# Patient Record
Sex: MALE | Race: WHITE | NOT HISPANIC OR LATINO | Employment: UNEMPLOYED | URBAN - METROPOLITAN AREA
[De-identification: names, ages, dates, MRNs, and addresses within clinical notes are randomized per-mention and may not be internally consistent; named-entity substitution may affect disease eponyms.]

---

## 2019-07-10 ENCOUNTER — OFFICE VISIT (OUTPATIENT)
Dept: UROLOGY | Facility: MEDICAL CENTER | Age: 54
End: 2019-07-10
Payer: COMMERCIAL

## 2019-07-10 VITALS
HEIGHT: 66 IN | HEART RATE: 78 BPM | BODY MASS INDEX: 20.89 KG/M2 | SYSTOLIC BLOOD PRESSURE: 126 MMHG | DIASTOLIC BLOOD PRESSURE: 82 MMHG | WEIGHT: 130 LBS

## 2019-07-10 DIAGNOSIS — N41.0 ACUTE PROSTATITIS: Primary | ICD-10-CM

## 2019-07-10 PROCEDURE — 99204 OFFICE O/P NEW MOD 45 MIN: CPT | Performed by: UROLOGY

## 2019-07-10 RX ORDER — SULFAMETHOXAZOLE AND TRIMETHOPRIM 800; 160 MG/1; MG/1
1 TABLET ORAL EVERY 12 HOURS SCHEDULED
Qty: 28 TABLET | Refills: 0 | Status: SHIPPED | OUTPATIENT
Start: 2019-07-10 | End: 2019-07-24

## 2019-07-10 NOTE — PROGRESS NOTES
Assessment/Plan:  Urinalysis is clear  Symptoms mildly suggestive of prostatitis, will treat with Bactrim twice per day for his 14 days  PSA in six weeks  No problem-specific Assessment & Plan notes found for this encounter  Diagnoses and all orders for this visit:    Acute prostatitis  -     sulfamethoxazole-trimethoprim (BACTRIM DS) 800-160 mg per tablet; Take 1 tablet by mouth every 12 (twelve) hours for 14 days  -     PSA Total, Diagnostic; Future          Subjective:      Patient ID: Wil Ortiz is a 48 y o  male  Patient seen many years ago for a vasectomy  Current issue is up to one year of the following symptoms:  Occasional pain at tip of penis before or at the end of urination  Bad odor to urine  Needles and pins sensation sometimes during ejaculation    Overall his urine has a good stream, not much frequency, occasional aching in the perineum  The following portions of the patient's history were reviewed and updated as appropriate: allergies, current medications, past family history, past medical history, past social history, past surgical history and problem list     Review of Systems   All other systems reviewed and are negative  Objective:      /82 (BP Location: Left arm, Patient Position: Sitting, Cuff Size: Adult)   Pulse 78   Ht 5' 6" (1 676 m)   Wt 59 kg (130 lb)   BMI 20 98 kg/m²          Physical Exam   Constitutional: He is oriented to person, place, and time  He appears well-developed and well-nourished  No distress  HENT:   Head: Normocephalic and atraumatic  Eyes: Conjunctivae are normal    Cardiovascular: Normal rate and regular rhythm  Pulmonary/Chest: Effort normal and breath sounds normal  No respiratory distress  He has no wheezes  Abdominal: Soft  Bowel sounds are normal  He exhibits no distension and no mass  There is no tenderness     Genitourinary:   Genitourinary Comments: Penis and testes normal    Prostate large nodules Neurological: He is alert and oriented to person, place, and time  Skin: Skin is warm and dry  He is not diaphoretic

## 2019-07-24 PROBLEM — IMO0001 ASYMMETRICAL LEFT SENSORINEURAL HEARING LOSS: Status: ACTIVE | Noted: 2019-07-24

## 2019-07-25 PROBLEM — H93.12 LEFT-SIDED TINNITUS: Status: ACTIVE | Noted: 2019-07-25

## 2020-12-11 ENCOUNTER — HOSPITAL ENCOUNTER (OUTPATIENT)
Dept: MRI IMAGING | Facility: HOSPITAL | Age: 55
Discharge: HOME | End: 2020-12-11
Attending: OTOLARYNGOLOGY
Payer: COMMERCIAL

## 2020-12-11 DIAGNOSIS — IMO0001 ASYMMETRICAL LEFT SENSORINEURAL HEARING LOSS: ICD-10-CM

## 2020-12-11 PROCEDURE — 70553 MRI BRAIN STEM W/O & W/DYE: CPT

## 2020-12-11 PROCEDURE — A9577 INJ MULTIHANCE: HCPCS | Performed by: OTOLARYNGOLOGY

## 2020-12-11 PROCEDURE — G1004 CDSM NDSC: HCPCS

## 2020-12-11 RX ADMIN — GADOBENATE DIMEGLUMINE 7 ML: 529 INJECTION, SOLUTION INTRAVENOUS at 11:43

## 2021-06-10 ENCOUNTER — OFFICE VISIT (OUTPATIENT)
Dept: URGENT CARE | Facility: CLINIC | Age: 56
End: 2021-06-10
Payer: COMMERCIAL

## 2021-06-10 VITALS
SYSTOLIC BLOOD PRESSURE: 121 MMHG | DIASTOLIC BLOOD PRESSURE: 73 MMHG | HEART RATE: 83 BPM | RESPIRATION RATE: 18 BRPM | HEIGHT: 65 IN | TEMPERATURE: 98.6 F | BODY MASS INDEX: 23.32 KG/M2 | WEIGHT: 140 LBS | OXYGEN SATURATION: 98 %

## 2021-06-10 DIAGNOSIS — R09.82 POST-NASAL DRIP: Primary | ICD-10-CM

## 2021-06-10 PROCEDURE — 99213 OFFICE O/P EST LOW 20 MIN: CPT | Performed by: NURSE PRACTITIONER

## 2021-06-10 NOTE — PROGRESS NOTES
[facility]        NAME: Martita Jones is a 54 y o  male  : 1965    MRN: [id number]  DATE: Li 10, 2021  TIME: 10:55 AM    Assessment and Plan   Post-nasal drip [R09 82]  1  Post-nasal drip           Patient Instructions     Patient Instructions   The this may be related to postnasal drip  Would recommend you start allergy medication  You can continue with Chloraseptic spray  Follow-up if you develop any worsening symptoms such as fever, cough, congestion  Go to the ER with chest pain or shortness of breath  Postnasal Drip   AMBULATORY CARE:   Postnasal drip  is a condition that causes a large amount of mucus to collect in your throat or nose  It may also be called upper airway cough syndrome because the mucus causes repeated coughing  You may have a sore throat, or throat tissues may swell  This may feel like a lump in your throat  You may also feel like you need to clear your throat often  Contact your healthcare provider if:   · You have trouble breathing because of the mucus  · You have new or worsening symptoms, even with treatment  · You have signs of an infection, such as yellow or green mucus, or a fever  · You have questions or concerns about your condition or care  Treatment  may include any of the following:  · Medicines  may be given to thin the mucus  You may need to swallow the medicine or use a device to flush your sinuses with liquid squirted into your nose  Nasal sprays may also be needed to keep the tissues in your nose moist  Medicines can also relieve congestion  Allergy medicine may help if your symptoms are caused by seasonal allergies, such as hay fever  You may need medicine to help control GERD  · Antibiotics  may be needed to treat a bacterial infection  Manage postnasal drip:   · Use a humidifier or vaporizer  Use a cool mist humidifier or a vaporizer to increase air moisture in your home  This may make it easier for you to breathe  · Drink more liquids as directed  Liquids help keep your air passages moist and help you cough up mucus  Ask how much liquid to drink each day and which liquids are best for you  · Avoid cold air and dry, heated air  Cold or dry air can trigger postnasal drip  Try to stay inside on cold days, or keep your mouth covered  Do not stay long in areas that have dry, heated air  · Do not smoke, and avoid secondhand smoke  Nicotine and other chemicals in cigarettes and cigars can irritate your throat and make coughing worse  Ask your healthcare provider for information if you currently smoke and need help to quit  E-cigarettes or smokeless tobacco still contain nicotine  Talk to your healthcare provider before you use these products  Follow up with your healthcare provider as directed:  Write down your questions so you remember to ask them during your visits  © Copyright [address] Information is for End User's use only and may not be sold, redistributed or otherwise used for commercial purposes  All illustrations and images included in CareNotes® are the copyrighted property of A D A M , Inc  or [address] [place] [address]  The above information is an  only  It is not intended as medical advice for individual conditions or treatments  Talk to your doctor, nurse or pharmacist before following any medical regimen to see if it is safe and effective for you  Chief Complaint     Chief Complaint   Patient presents with    Sore Throat     X 1 week only in the morning  History of Present Illness   Raffy Sierra presents to the clinic c/o    This is a 26-year-old male here today with complaints of sore throat  He states sore throat is only in the morning  He uses some spray and symptoms resolved  He denies any cough, congestion, ear pain, fever, body aches, chills  He does know he does have postnasal drip especially while at work but does not notice sore throat at work    He does note that he was out of work for a while and recently return to work then symptoms started  He does work in a cooler  He denies any history of allergies  No chest pain or shortness of breath  The      Review of Systems   Review of Systems   Constitutional: Negative for activity change, chills, fatigue and fever  HENT: Positive for postnasal drip and sore throat  Respiratory: Negative  Cardiovascular: Negative  Neurological: Negative  Psychiatric/Behavioral: Negative  Current Medications     No long-term medications on file  Current Allergies     Allergies as of 06/10/2021 - Reviewed 06/10/2021   Allergen Reaction Noted    Codeine  03/28/2016            The following portions of the patient's history were reviewed and updated as appropriate: allergies, current medications, past family history, past medical history, past social history, past surgical history and problem list     Objective   /73   Pulse 83   Temp 98 6 °F (37 °C) (Temporal)   Resp 18   Ht 5' 5" (1 651 m)   Wt 63 5 kg (140 lb)   SpO2 98%   BMI 23 30 kg/m²        Physical Exam     Physical Exam  Vitals signs and nursing note reviewed  Constitutional:       General: He is in acute distress  Appearance: He is well-developed  He is not ill-appearing or toxic-appearing  HENT:      Mouth/Throat:      Comments: Posterior pharynx mildly erythemic with some cobblestone appearance  Cardiovascular:      Rate and Rhythm: Normal rate and regular rhythm  Pulmonary:      Effort: Pulmonary effort is normal  No respiratory distress  Breath sounds: Normal breath sounds  No stridor  No wheezing, rhonchi or rales  Chest:      Chest wall: No tenderness  Neurological:      General: No focal deficit present  Mental Status: He is alert and oriented to person, place, and time     Psychiatric:         Mood and Affect: Mood normal          Behavior: Behavior normal

## 2021-06-10 NOTE — PATIENT INSTRUCTIONS
The this may be related to postnasal drip  Would recommend you start allergy medication  You can continue with Chloraseptic spray  Follow-up if you develop any worsening symptoms such as fever, cough, congestion  Go to the ER with chest pain or shortness of breath  Postnasal Drip   AMBULATORY CARE:   Postnasal drip  is a condition that causes a large amount of mucus to collect in your throat or nose  It may also be called upper airway cough syndrome because the mucus causes repeated coughing  You may have a sore throat, or throat tissues may swell  This may feel like a lump in your throat  You may also feel like you need to clear your throat often  Contact your healthcare provider if:   · You have trouble breathing because of the mucus  · You have new or worsening symptoms, even with treatment  · You have signs of an infection, such as yellow or green mucus, or a fever  · You have questions or concerns about your condition or care  Treatment  may include any of the following:  · Medicines  may be given to thin the mucus  You may need to swallow the medicine or use a device to flush your sinuses with liquid squirted into your nose  Nasal sprays may also be needed to keep the tissues in your nose moist  Medicines can also relieve congestion  Allergy medicine may help if your symptoms are caused by seasonal allergies, such as hay fever  You may need medicine to help control GERD  · Antibiotics  may be needed to treat a bacterial infection  Manage postnasal drip:   · Use a humidifier or vaporizer  Use a cool mist humidifier or a vaporizer to increase air moisture in your home  This may make it easier for you to breathe  · Drink more liquids as directed  Liquids help keep your air passages moist and help you cough up mucus  Ask how much liquid to drink each day and which liquids are best for you  · Avoid cold air and dry, heated air  Cold or dry air can trigger postnasal drip   Try to stay inside on cold days, or keep your mouth covered  Do not stay long in areas that have dry, heated air  · Do not smoke, and avoid secondhand smoke  Nicotine and other chemicals in cigarettes and cigars can irritate your throat and make coughing worse  Ask your healthcare provider for information if you currently smoke and need help to quit  E-cigarettes or smokeless tobacco still contain nicotine  Talk to your healthcare provider before you use these products  Follow up with your healthcare provider as directed:  Write down your questions so you remember to ask them during your visits  © Copyright [address] [place] [address] Drive Information is for End User's use only and may not be sold, redistributed or otherwise used for commercial purposes  All illustrations and images included in CareNotes® are the copyrighted property of [employer]  or [facility] Maykel Muhammad   The above information is an  only  It is not intended as medical advice for individual conditions or treatments  Talk to your doctor, nurse or pharmacist before following any medical regimen to see if it is safe and effective for you

## 2021-08-05 ENCOUNTER — NURSE TRIAGE (OUTPATIENT)
Dept: OTHER | Facility: OTHER | Age: 56
End: 2021-08-05

## 2021-08-05 DIAGNOSIS — Z11.59 SPECIAL SCREENING EXAMINATION FOR VIRAL DISEASE: Primary | ICD-10-CM

## 2021-08-05 PROCEDURE — U0003 INFECTIOUS AGENT DETECTION BY NUCLEIC ACID (DNA OR RNA); SEVERE ACUTE RESPIRATORY SYNDROME CORONAVIRUS 2 (SARS-COV-2) (CORONAVIRUS DISEASE [COVID-19]), AMPLIFIED PROBE TECHNIQUE, MAKING USE OF HIGH THROUGHPUT TECHNOLOGIES AS DESCRIBED BY CMS-2020-01-R: HCPCS | Performed by: FAMILY MEDICINE

## 2021-08-05 PROCEDURE — U0005 INFEC AGEN DETEC AMPLI PROBE: HCPCS | Performed by: FAMILY MEDICINE

## 2021-08-05 NOTE — TELEPHONE ENCOUNTER
Reason for Disposition   [1] COVID-19 diagnosed by positive lab test AND [2] NO symptoms (e g , cough, fever, others)    Additional Information   Negative: [1] COVID-19 infection suspected by caller or triager AND [2] mild symptoms (cough, fever, or others) AND [1] no complications or SOB    Protocols used: CORONAVIRUS (COVID-19) [facility]AGNOSED OR SUSPECTED[facility]

## 2021-08-05 NOTE — TELEPHONE ENCOUNTER
1  Were you within 6 feet or less, for up to 15 minutes or more with a person that has a confirmed COVID-19 test? His sister in law is positive  2  What was the date of your exposure? 7/31/2021  3  Are you experiencing any symptoms attributed to the virus?  (Assess for SOB, cough, fever, difficulty breathing) none today he was feeling under the weather yesterday  4   HIGH RISK: Do you have any history heart or lung conditions, weakened immune system, diabetes, Asthma, CHF, HIV, COPD, Chemo, renal failure, sickle cell, etc? none

## 2021-08-05 NOTE — TELEPHONE ENCOUNTER
Regarding: symptomatic-covid test-exposure  ----- Message from Adeline Ureña sent at 8/5/2021 10:51 AM EDT -----  "I have been feeling under the weather    I was told I was exposed to someone on Saturday, who tested positive for covid "

## 2023-12-06 ENCOUNTER — APPOINTMENT (OUTPATIENT)
Dept: RADIOLOGY | Facility: CLINIC | Age: 58
End: 2023-12-06
Payer: COMMERCIAL

## 2023-12-06 ENCOUNTER — OFFICE VISIT (OUTPATIENT)
Dept: FAMILY MEDICINE CLINIC | Facility: CLINIC | Age: 58
End: 2023-12-06
Payer: COMMERCIAL

## 2023-12-06 ENCOUNTER — HOSPITAL ENCOUNTER (OUTPATIENT)
Dept: ULTRASOUND IMAGING | Facility: HOSPITAL | Age: 58
Discharge: HOME | End: 2023-12-06
Attending: INTERNAL MEDICINE
Payer: COMMERCIAL

## 2023-12-06 VITALS
TEMPERATURE: 97.8 F | BODY MASS INDEX: 23.37 KG/M2 | OXYGEN SATURATION: 98 % | RESPIRATION RATE: 18 BRPM | SYSTOLIC BLOOD PRESSURE: 130 MMHG | DIASTOLIC BLOOD PRESSURE: 84 MMHG | HEART RATE: 68 BPM | HEIGHT: 66 IN | WEIGHT: 145.4 LBS

## 2023-12-06 DIAGNOSIS — N50.812 LEFT TESTICULAR PAIN: ICD-10-CM

## 2023-12-06 DIAGNOSIS — R10.32 LEFT GROIN PAIN: ICD-10-CM

## 2023-12-06 DIAGNOSIS — M17.12 PRIMARY OSTEOARTHRITIS OF LEFT KNEE: ICD-10-CM

## 2023-12-06 DIAGNOSIS — M25.552 LEFT HIP PAIN: ICD-10-CM

## 2023-12-06 DIAGNOSIS — Z00.00 ENCOUNTER FOR MEDICAL EXAMINATION TO ESTABLISH CARE: Primary | ICD-10-CM

## 2023-12-06 PROBLEM — IMO0001 ASYMMETRICAL LEFT SENSORINEURAL HEARING LOSS: Status: RESOLVED | Noted: 2019-07-24 | Resolved: 2023-12-06

## 2023-12-06 PROBLEM — N41.0 ACUTE PROSTATITIS: Status: RESOLVED | Noted: 2019-07-10 | Resolved: 2023-12-06

## 2023-12-06 PROBLEM — H93.12 LEFT-SIDED TINNITUS: Status: RESOLVED | Noted: 2019-07-25 | Resolved: 2023-12-06

## 2023-12-06 PROCEDURE — 76870 US EXAM SCROTUM: CPT

## 2023-12-06 PROCEDURE — 73562 X-RAY EXAM OF KNEE 3: CPT

## 2023-12-06 PROCEDURE — 99204 OFFICE O/P NEW MOD 45 MIN: CPT | Performed by: INTERNAL MEDICINE

## 2023-12-06 NOTE — PROGRESS NOTES
[address] [facility]        NAME: Alex Jarvis is a 62 y.o. male  : 1965    MRN: [id number]  DATE: 2023  TIME: 1:59 PM    Assessment and Plan   1. Encounter for medical examination to establish care    2. Left groin pain  -     US scrotum and groin area; Future; Expected date: 2023  -     CT lower extremity wo contrast left; Future; Expected date: 2023    3. Left testicular pain  -     US scrotum and groin area; Future; Expected date: 2023    4. Primary osteoarthritis of left knee  -     XR knee 3 vw left non injury; Future; Expected date: 2023    5. Left hip pain  -     CT lower extremity wo contrast left; Future; Expected date: 2023             Chief Complaint     Chief Complaint   Patient presents with   • Establish Care   • Annual Exam     refusing         History of Present Illness       61yo male without significant past medical history other than osteoarthritis of his hands here to establish care and discuss left groin pain. Started abruptly a few months ago. Denies injury or trauma. Did have a fall but this occurred after pain began. Saw Urology because pain radiates to left testicle. Also had xray of bilateral hips showing moderate OA. Pain worse with lifting his leg, getting out of his car, climbing stairs, etc. No dysuria, hematuria or penile discharge. Taking ibuprofen with some relief. Declines screening tests and preventive services at this time. Would rather focus on acute issue. Review of Systems   Review of Systems   Constitutional:  Negative for appetite change, chills, fatigue and fever. Respiratory:  Negative for chest tightness and shortness of breath. Genitourinary:  Positive for testicular pain. Negative for difficulty urinating, genital sores, hematuria, penile pain, scrotal swelling and urgency. Musculoskeletal:  Positive for arthralgias and gait problem.          Current Medications     No current outpatient medications on file. Current Allergies     Allergies as of 12/06/2023 - Reviewed 12/06/2023   Allergen Reaction Noted   • Codeine Other (See Comments) 03/28/2016            The following portions of the patient's history were reviewed and updated as appropriate: allergies, current medications, past family history, past medical history, past social history, past surgical history and problem list.     Past Medical History:   Diagnosis Date   • Arthritis    • Environmental allergies    • HL (hearing loss)        Past Surgical History:   Procedure Laterality Date   • HAND SURGERY     • VASECTOMY         Family History   Adopted: Yes   Problem Relation Age of Onset   • No Known Problems Mother    • No Known Problems Father          Medications have been verified. Objective   /84   Pulse 68   Temp 97.8 °F (36.6 °C)   Resp 18   Ht 5' 6" (1.676 m)   Wt 66 kg (145 lb 6.4 oz)   SpO2 98%   BMI 23.47 kg/m²        Physical Exam     Physical Exam  Vitals reviewed. Exam conducted with a chaperone present. Constitutional:       General: He is not in acute distress. Appearance: He is normal weight. Abdominal:      Hernia: There is no hernia in the left inguinal area. Genitourinary:     Penis: Normal and circumcised. Testes:         Left: Tenderness present. Mass or swelling not present. Epididymis:      Left: Tenderness present. Comments: Tender along left inguinal area  Musculoskeletal:      Right hip: Normal.      Left hip: Tenderness present. No deformity or bony tenderness. Decreased range of motion. Decreased strength. Left knee: Crepitus present. Neurological:      Mental Status: He is alert. Motor: Motor function is intact. Gait: Gait is intact. Psychiatric:         Mood and Affect: Mood and affect normal.         Speech: Speech normal.         Behavior: Behavior normal. Behavior is cooperative.              Results:  No results found for: "SODIUM", "K", "CL", "CO2", "BUN", "CREATININE", "GLUC", "CALCIUM"    No results found for: "HGBA1C"    No results found for: "WBC", "HGB", "HCT", "MCV", "PLT"

## 2023-12-06 NOTE — PATIENT INSTRUCTIONS
Please get repeat US groin and scrotum at this time.  If normal, will order CT left hip  Xray left knee ordered as well

## 2023-12-14 DIAGNOSIS — N43.40 SPERMATOCELE OF EPIDIDYMIS: Primary | ICD-10-CM

## 2023-12-15 ENCOUNTER — HOSPITAL ENCOUNTER (OUTPATIENT)
Dept: CT IMAGING | Facility: HOSPITAL | Age: 58
End: 2023-12-15
Attending: INTERNAL MEDICINE
Payer: COMMERCIAL

## 2023-12-15 DIAGNOSIS — M25.552 LEFT HIP PAIN: ICD-10-CM

## 2023-12-15 DIAGNOSIS — R10.32 LEFT GROIN PAIN: ICD-10-CM

## 2023-12-15 PROCEDURE — G1004 CDSM NDSC: HCPCS

## 2023-12-15 PROCEDURE — 73700 CT LOWER EXTREMITY W/O DYE: CPT

## 2023-12-19 DIAGNOSIS — M25.552 LEFT HIP PAIN: ICD-10-CM

## 2023-12-19 DIAGNOSIS — M87.00 AVN (AVASCULAR NECROSIS OF BONE) (HCC): Primary | ICD-10-CM

## 2023-12-21 ENCOUNTER — OFFICE VISIT (OUTPATIENT)
Dept: OBGYN CLINIC | Facility: CLINIC | Age: 58
End: 2023-12-21
Payer: COMMERCIAL

## 2023-12-21 VITALS
BODY MASS INDEX: 23.3 KG/M2 | SYSTOLIC BLOOD PRESSURE: 160 MMHG | DIASTOLIC BLOOD PRESSURE: 98 MMHG | HEART RATE: 73 BPM | HEIGHT: 66 IN | WEIGHT: 145 LBS

## 2023-12-21 DIAGNOSIS — M16.12 PRIMARY OSTEOARTHRITIS OF ONE HIP, LEFT: Primary | ICD-10-CM

## 2023-12-21 DIAGNOSIS — M25.552 LEFT HIP PAIN: ICD-10-CM

## 2023-12-21 DIAGNOSIS — M87.00 AVN (AVASCULAR NECROSIS OF BONE) (HCC): ICD-10-CM

## 2023-12-21 PROCEDURE — 99204 OFFICE O/P NEW MOD 45 MIN: CPT | Performed by: STUDENT IN AN ORGANIZED HEALTH CARE EDUCATION/TRAINING PROGRAM

## 2023-12-21 RX ORDER — IBUPROFEN 800 MG/1
TABLET ORAL
COMMUNITY

## 2023-12-21 NOTE — PROGRESS NOTES
Ortho Sports Medicine New Patient Hip Visit    Assesment:   58 y.o. male with left hip pain ongoing for approximately 1 month without any known injury.  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    Plan:  Reviewed the history, exam, and imaging with the patient in clinic today.  I discussed with the patient that he does have left hip OA as well as AVN of the femoral head without collapse on imaging.  On exam, patient consistently endorses pain in the left groin with provocative hip maneuvers.  I discussed potential treatment options including both conservative and surgical management.  Patient has tried oral pain medications without significant improvement.  I did discuss other conservative management options including physical therapy and injections.  Patient was interested in discussion of his surgical options.  Discussed with the patient that I would place a referral to one of our joint reconstruction colleagues for further discussion of surgical options.  Patient was amenable to this plan.  All his questions were answered.  He can follow-up on an as-needed basis.    Conservative Treatment:   Ice to hip region for 20 minutes at least 1-2 times daily.  Tylenol for pain.  Referral provided to Dr. Hannah to discuss surgical intervention    Imaging:  All imaging from prior to today was reviewed by myself and explained to the patient.     Injection:    No Injection planned at this time.    Surgery:  No surgery is recommended at this point, continue with conservative treatment plan as noted.    Follow up:  Return if symptoms worsen or fail to improve.        Chief Complaint   Patient presents with    Left Hip - Pain       History of Present Illness:  The patient is a 58 y.o. male seen in clinic for left hip pain. The pain started 1 month ago. There was no mechanism of injury. The pain is now located in the left groin area. Symptoms are aggravated by standing after  prolonged sitting.  After ambulating for a short period of time, the pain will then dissipated. The patient denies any low back pain, radiating pain down the lower extremity, numbness or tingling. The patient has tried Ibuprofen, ice and heat, all of which do help his pain. Symptoms have not improved since onset. Patient has a history of prior prednisone use 6 months to a year ago.  He drinks alcohol occasionally on weekends.  Denies tobacco/HIV.    The patient has the following co-morbidities: n/a      Hip Surgical History:  None    Past Medical, Social and Family History:  Past Medical History:   Diagnosis Date    Arthritis     Environmental allergies     HL (hearing loss)      Past Surgical History:   Procedure Laterality Date    HAND SURGERY      VASECTOMY       Allergies   Allergen Reactions    Codeine Other (See Comments)     Unknown, reaction in childhood     Current Outpatient Medications on File Prior to Visit   Medication Sig Dispense Refill    ibuprofen (MOTRIN) 800 mg tablet take 1 tablet by mouth every 8 hours if needed for mild pain       No current facility-administered medications on file prior to visit.     Social History     Socioeconomic History    Marital status:      Spouse name: Not on file    Number of children: 1    Years of education: Not on file    Highest education level: Not on file   Occupational History    Not on file   Tobacco Use    Smoking status: Never    Smokeless tobacco: Never   Vaping Use    Vaping status: Never Used   Substance and Sexual Activity    Alcohol use: Yes     Alcohol/week: 2.0 standard drinks of alcohol     Types: 2 Standard drinks or equivalent per week     Comment: drinks on weekends    Drug use: Never    Sexual activity: Not on file   Other Topics Concern    Not on file   Social History Narrative    Daily caffeine use- 1 cup of coffee     Social Determinants of Health     Financial Resource Strain: Not on file   Food Insecurity: Not on file  "  Transportation Needs: Not on file   Physical Activity: Not on file   Stress: Not on file   Social Connections: Not on file   Intimate Partner Violence: Not on file   Housing Stability: Not on file         I have reviewed the past medical, surgical, social and family history, medications and allergies as documented in the EMR.    Review of systems: ROS is negative other than that noted in the HPI.  Psychiatric/Behavioral: Negative for agitation.      Physical Exam:    Blood pressure 160/98, pulse 73, height 5' 6\" (1.676 m), weight 65.8 kg (145 lb).    General/Constitutional: NAD, well developed, well nourished  HENT: Normocephalic, atraumatic  CV: Intact distal pulses, regular rate  Resp: No respiratory distress or labored breathing  Abd: No abdominal distention  Lymphatic: No lymphadenopathy palpated  Neuro: Alert and Oriented x 3, no focal deficits noted  Psych: Normal mood, normal affect, normal judgement, normal behavior  Skin: Warm, dry, no rashes, no erythema      Focused left Hip Exam:  No dislocation/deformity  ROM: forward flexion of 100 degrees, 20 degrees and 40 ER  Greater troch:non-tender   SI joint: non-tender  Jenelle test: negative  Darlene's test:  not tested  Abduction: 5/5  AT/GS intact  Positive log roll  Pain with resisted hip flexion    Back:    No TTP over lumbar spinous processes, paraspinal musculature  Negative SLR     No calf tenderness to palpation bilaterally    LE NV Exam: +2 DP/PT pulses bilaterally  Sensation intact to light touch L2-S1 bilaterally, SPN/DPN/TA motor intact    Hip Imaging:    X-rays of the left hip with pelvis were obtained on 9/19/23 and reviewed with the patient. Based on my independent evaluation, the imaging shows moderate osteoarthritis of the bilateral hips.    CT of the left lower extremity was obtained on 12/15/23 and reviewed with the patient. Based on my independent evaluation, the imaging shows avascular necrosis of the left femoral head without subchondral " cortical collapse as well as moderate left hip osteoarthritis..      Scribe Attestation      I,:  Lyubov Richmond PA-C am acting as a scribe while in the presence of the attending physician.:       I,:  Ubaldo Gracia MD personally performed the services described in this documentation    as scribed in my presence.:

## 2024-01-05 ENCOUNTER — OFFICE VISIT (OUTPATIENT)
Dept: OBGYN CLINIC | Facility: CLINIC | Age: 59
End: 2024-01-05
Payer: COMMERCIAL

## 2024-01-05 ENCOUNTER — APPOINTMENT (OUTPATIENT)
Dept: RADIOLOGY | Age: 59
End: 2024-01-05
Payer: COMMERCIAL

## 2024-01-05 VITALS
WEIGHT: 145 LBS | DIASTOLIC BLOOD PRESSURE: 90 MMHG | HEIGHT: 66 IN | BODY MASS INDEX: 23.3 KG/M2 | HEART RATE: 85 BPM | SYSTOLIC BLOOD PRESSURE: 150 MMHG

## 2024-01-05 DIAGNOSIS — M87.00 AVN (AVASCULAR NECROSIS OF BONE) (HCC): ICD-10-CM

## 2024-01-05 DIAGNOSIS — M16.12 PRIMARY OSTEOARTHRITIS OF ONE HIP, LEFT: ICD-10-CM

## 2024-01-05 DIAGNOSIS — M87.00 AVN (AVASCULAR NECROSIS OF BONE) (HCC): Primary | ICD-10-CM

## 2024-01-05 PROCEDURE — 99215 OFFICE O/P EST HI 40 MIN: CPT | Performed by: STUDENT IN AN ORGANIZED HEALTH CARE EDUCATION/TRAINING PROGRAM

## 2024-01-05 PROCEDURE — 73502 X-RAY EXAM HIP UNI 2-3 VIEWS: CPT

## 2024-01-05 RX ORDER — ASCORBIC ACID 500 MG
500 TABLET ORAL 2 TIMES DAILY
Qty: 60 TABLET | Refills: 0 | Status: SHIPPED | OUTPATIENT
Start: 2024-01-05 | End: 2024-02-04

## 2024-01-05 RX ORDER — CHLORHEXIDINE GLUCONATE ORAL RINSE 1.2 MG/ML
15 SOLUTION DENTAL ONCE
OUTPATIENT
Start: 2024-01-05 | End: 2024-01-05

## 2024-01-05 RX ORDER — CHLORHEXIDINE GLUCONATE 4 G/100ML
SOLUTION TOPICAL DAILY PRN
OUTPATIENT
Start: 2024-01-05

## 2024-01-05 RX ORDER — ACETAMINOPHEN 325 MG/1
975 TABLET ORAL ONCE
OUTPATIENT
Start: 2024-01-05 | End: 2024-01-05

## 2024-01-05 RX ORDER — FOLIC ACID 1 MG/1
1 TABLET ORAL DAILY
Qty: 30 TABLET | Refills: 0 | Status: SHIPPED | OUTPATIENT
Start: 2024-01-05 | End: 2024-02-04

## 2024-01-05 RX ORDER — FERROUS SULFATE 324(65)MG
324 TABLET, DELAYED RELEASE (ENTERIC COATED) ORAL EVERY OTHER DAY
Qty: 15 TABLET | Refills: 0 | Status: SHIPPED | OUTPATIENT
Start: 2024-01-05 | End: 2024-02-04

## 2024-01-05 RX ORDER — MULTIVIT-MIN/IRON FUM/FOLIC AC 7.5 MG-4
1 TABLET ORAL DAILY
Qty: 30 TABLET | Refills: 0 | Status: SHIPPED | OUTPATIENT
Start: 2024-01-05 | End: 2024-02-04

## 2024-01-05 NOTE — PROGRESS NOTES
Date: 24  Andrei Morales   MRN# [id number]  : 1965      Chief Complaint: Left Hip Pain    Assessment and Plan:    Left Hip Avascular Necrosis   Left Hip Osteoarthritis     Patient is a Middle-Aged (Approx 40-64yo) male with functionally limiting hip pain with short distances and no modifiable risk factors.  Furthermore, they have radiographs which demonstrate moderate OA with AVN of the femoral head with significant cystic degeneration and slight subchondral collapse and a physical exam revealing moderate range of motion limitation. Given this, I have recommended surgical treatment in the for of total hip arthroplasty.    TOTAL HIP REPLACEMENT INDICATIONS AND RISKS  We had a lengthy discussion with the patient regarding the potential options for treatment.  Based on current presentation, radiographic exam, and lack of sufficient response to nonsurgical management including activity modification, NSAIDs and therapeutic exercise, I would offer treatment in the form of total hip arthroplasty at this time.      The potential risks and benefits were discussed in detail.    Patient current BMI is Body mass index is 23.4 kg/m²., I have recommended nutritionist and wt loss regimen if BMI over 35.    While no guarantees can be made, total hip replacement has a very high success rate in terms of relieving a patient's hip pain and returning them to a more active, independent lifestyle for 10-15 years or more. All surgery carries some risk; for hip replacement, the complication rate is low but may include: death (very rare), infection, bleeding requiring transfusion, blood clots in legs traveling to lungs, nerve and/or blood vessel damage, bone fracture, leg length difference, prosthetic hip dislocation, persistent hip pain and/or stiffness, and repeat surgery(ies). The risk of a major complication is generally 1-2 per 1000 cases. Total hip replacement should only be done if conservative treatment has  failed. The predicted revision rate is approximately 1% per year; in other words, 90% of hip replacements last 10 years or more, 80% last 20 years or more, and so on, assuming no injury. Additionally, we discussed anesthesia related complications which will be discussed in greater detail with the anesthesia team before surgery. The patient voiced their understanding of the surgical plan and potential complications and wishes to proceed with surgery.           Subjective:     Hip Pain  Patient complains of left hip pain. He denies any acute injury or trauma. The pain began 1 month ago. The pain is located groin and lateral and is made worse with walking and standing.  He describes the symptoms as sharp and stabbing. Symptoms improve with rest, ice, avoiding painful activities. The symptoms are worse with activity, stair climbing, getting up from a chair, weight bearing. The hip has not given out or felt unstable.  The patient is active in none. Treatment to date has been ice, heat, NSAID's, without significant relief.    External Records Reviewed: historical medical records, office notes, radiology reports, and x-ray reports    Allergy:  Allergies   Allergen Reactions    Codeine Other (See Comments)     Unknown, reaction in childhood     Medications:  all current active meds have been reviewed  Past Medical History:  Past Medical History:   Diagnosis Date    Arthritis     Environmental allergies     HL (hearing loss)      Past Surgical History:  Past Surgical History:   Procedure Laterality Date    HAND SURGERY      VASECTOMY       Family History:  Family History   Adopted: Yes   Problem Relation Age of Onset    No Known Problems Mother     No Known Problems Father      Social History:  Social History     Substance and Sexual Activity   Alcohol Use Yes    Alcohol/week: 2.0 standard drinks of alcohol    Types: 2 Standard drinks or equivalent per week    Comment: drinks on weekends     Social History     Substance and  "Sexual Activity   Drug Use Never     Social History     Tobacco Use   Smoking Status Never   Smokeless Tobacco Never           ROS:   Review of Systems   Constitutional:  Negative for chills, fatigue, fever and unexpected weight change.   HENT:  Negative for hearing loss, nosebleeds and sore throat.    Eyes:  Negative for pain, redness and visual disturbance.   Respiratory:  Negative for cough, shortness of breath and wheezing.    Cardiovascular:  Negative for chest pain, palpitations and leg swelling.   Gastrointestinal:  Negative for abdominal pain, nausea and vomiting.   Endocrine: Negative for polydipsia and polyuria.   Genitourinary:  Negative for frequency and urgency.   Skin:  Negative for color change, rash and wound.   Neurological:  Negative for dizziness, weakness, numbness and headaches.   Psychiatric/Behavioral:  Negative for behavioral problems, self-injury and suicidal ideas.        Objective:   BP Readings from Last 1 Encounters:   01/05/24 150/90      Wt Readings from Last 1 Encounters:   01/05/24 65.8 kg (145 lb)      Pulse Readings from Last 1 Encounters:   01/05/24 85      BMI: Estimated body mass index is 23.4 kg/m² as calculated from the following:    Height as of this encounter: 5' 6\" (1.676 m).    Weight as of this encounter: 65.8 kg (145 lb).      Physical Exam  Constitutional:       General: He is not in acute distress.     Appearance: He is well-developed.   Eyes:      Conjunctiva/sclera: Conjunctivae normal.      Pupils: Pupils are equal, round, and reactive to light.   Cardiovascular:      Rate and Rhythm: Normal rate and regular rhythm.   Pulmonary:      Effort: Pulmonary effort is normal.      Breath sounds: Normal breath sounds.   Abdominal:      General: Bowel sounds are normal.      Palpations: Abdomen is soft.   Musculoskeletal:      Cervical back: Normal range of motion and neck supple.   Skin:     General: Skin is warm and dry.      Findings: No erythema or rash.   Neurological: "      Mental Status: He is alert and oriented to person, place, and time.      Deep Tendon Reflexes: Reflexes are normal and symmetric.   Psychiatric:         Behavior: Behavior normal.         Gait and Station:   antalgic    Left Hip     Inspection: normal color, temperature, turgor and moisture    Range of Motion: FF: 100, ER: 40, IR: 20 with pain    positive log roll   negative Trendelenburg sign  negative  Stinchfield  negative  ALINA  negative  FADDIR    Motor: 5/5 Q/HS/TA/GS/EHL/FHL    Vascular: Toes WWP with BCR    SILT DP/SP/Robert/Saph/Tib    Right Hip     Inspection: normal color, temperature, turgor and moisture    Range of Motion: normal without pain    negative  log roll  negative Trendelenburg sign  negative  Stinchfield  negative  ALINA  negative  FADDIR    Motor: 5/5 Q/HS/TA/GS/EHL/FHL    Vascular: Toes WWP with BCR    SILT DP/SP/Robert/Saph/Tib    Images:    I personally reviewed relevant images in the PACS system and my interpretation is as follows:  X-rays of the left Hip reveal moderate degenerative changes with subchondral cysts and joint space narrowing    CT scan of the left hip reveals avascular necrosis of the femoral head with likely early subchondral collapse and cystic changes about the femoral head. Moderate hip osteoarthritis.         Justin Hannah MD  Adult Reconstruction Specialist   [facility]    Scribe Attestation      I,:  Stephane Rucker am acting as a scribe while in the presence of the attending physician.:       I,:  Justin Hannah MD personally performed the services described in this documentation    as scribed in my presence.:

## 2024-01-05 NOTE — PATIENT INSTRUCTIONS
Justin Hannah MD  Adult Reconstruction Specialist  Department of Orthopedic Surgery  [facility] [address]        Office Locations:   [facility]  [address] [place] [address]  Phone [phone]    [facility]  [address] [place] [address]  Phone: [phone]        TOTAL HIP REPLACEMENT            PREPARING FOR SURGERY    Pre-Operative Medical Clearance:  Typically done 2-3 weeks before surgery by the [facility] ([facility]). The clinic will call you to schedule this appointment.   This visit includes general check up with Bloodwork, EKG, possible COVID test and any other test deemed necessary.  If you see any specialist for other health conditions (cardiologist, pulmonologist, oncologist, hematologist, etc.), you might be required to obtain clearance before scheduling surgery.  One week prior to surgery, you need to stop   all anti-inflammatories (Ibuprofen, Aleve, Celebrex, Meloxicam)   All blood thinners (Coumadin, Warfarin, Plavix, Xarelto, etc.). Contact your cardiologist to advise on need for bridging medication during that period.    Pre-Operative Authorization:  Our office will contact your insurance carrier at the time of scheduling to obtain pre-authorization of your surgery. If authorization is not approved 1-2 days before surgery, you will be contacted to reschedule your procedure for a later date.   You may also contact your insurance carrier prior to surgery to get an estimated out-of-pocket expense of your scheduled procedure.     Surgery Time and Follow-up  Our office will contact you 2-3 days before your surgery date to inform you about the exact arrival time.  Our office will schedule the first post op appointment as well. This will be 10-14 days after surgery.  Your second follow up is around 6 weeks after surgery, and Xray will be taken during that visit.    Pain Medications and Pain  Management  If you have been taking opioids/narcotics from a pain management or another doctor, you will need to obtain adequate pain pills from that doctor. Please make those arrangements before surgery to have what you need for recovery.  We will make every effort to have your prescription sent to the pharmacy 2-3 days prior to surgery or on the same day of surgery. Please verify the correct pharmacy location with office staff as this cannot be changed once narcotics are sent.    Assistance  Majority of patients will go home same day of surgery. Make arrangements for a family member or friend to drive you home.  Most patients will only need someone around to assist them for the first few days after surgery.  In certain situations, you may stay overnight in the hospital and be discharged home the next day with home health services if needed.  If you live more than 2 hours away from the hospital, you will stay overnight in the hospital and be discharged home the next day.  It may take you 3-4 weeks before felling comfortable driving.    Dental Work  Complete any dental work prior to scheduling surgery.  This includes cleaning, fillings, extractions or treatment of any dental infection.  Failure to take care of oral health prior to your surgery could delay your surgery as a dental infection could put you at risk of surgical infection.    Work / FMLA forms  Please contact our office to complete any required form.  Allow 5-7 days for completion.          INSTRUCTIONS FOR THE DAY BEFORE AND MORNING OF SURGERY      Day Before Surgery:  Unless you have history allergy to acetaminophen (Tylenol) or history of liver disease, please take 2 Tylenol 500mg tablets every 6-8 hours for a total of 3 times only. Do not exceed 3000mg in a 24-hour period.  Wash with Hibiclens (Chlorhexidine soap) provided by Anesthesia Clinic in the evening.  DO NOT EAT OR DRINK ANYTHING AFTER MIDNIGHT THE NIGHT BEFORE SURGERY, REGARDLESS OF YOUR  SURGERY TIME, unless specifically instructed so by anesthesia.  Try to change bed sheets the night before surgery.  Get a good night's sleep !!!    Morning of Surgery:  DO NOT EAT OR DRINK ANYTHING UNLESS INSTRUCTED BY [facility] CLINIC.  Wash again with Hibiclens.  Arrive at the hospital at your given time.  Go the registration desk for check-in. Have your photo ID and insurance card.  The hospital will arrange for all Durable Medical Equipment (Walker, Toilet Seat, etc.)

## 2024-01-12 ENCOUNTER — TELEPHONE (OUTPATIENT)
Dept: OBGYN CLINIC | Facility: CLINIC | Age: 59
End: 2024-01-12

## 2024-01-12 NOTE — TELEPHONE ENCOUNTER
Called and left message for patient regarding moving up his surgery from 3/27/24 to 2/28/24. Stated that we have an opening on 2/28 if he'd like to take it.   Provided my direct ph [phone] for call back.

## 2024-01-15 NOTE — TELEPHONE ENCOUNTER
Patient called back and agreed to moving his surgery from 3/27/24 to 2/28/24 at . All related appointments updated.

## 2024-02-01 ENCOUNTER — TELEPHONE (OUTPATIENT)
Age: 59
End: 2024-02-01

## 2024-02-01 NOTE — TELEPHONE ENCOUNTER
Called and spoke with patient. Explained that patient should start taking all 4 pre-op vitamins 30 days prior to surgery. Patient understood this, but was just reading the label on the folic acid and wanted clarification.   All questions answered.

## 2024-02-01 NOTE — TELEPHONE ENCOUNTER
Caller: Patient    Doctor: [place]    Reason for call: Patient is asking if he should take the folic acid before or after the surgery.    Call back#: [phone]    ARTHROPLASTY HIP TOTAL ANTERIOR, LEFT, SAMEDAY DISCHARGE (Left: Hip)

## 2024-02-02 ENCOUNTER — TELEPHONE (OUTPATIENT)
Dept: OBGYN CLINIC | Facility: HOSPITAL | Age: 59
End: 2024-02-02

## 2024-02-02 PROBLEM — M19.049 OSTEOARTHRITIS OF METACARPOPHALANGEAL (MCP) JOINT: Status: ACTIVE | Noted: 2020-01-30

## 2024-02-02 NOTE — PROGRESS NOTES
Internal Medicine Pre-Operative Evaluation:     Reason for Visit: Pre-operative Evaluation for Risk Stratification and Optimization    Patient ID: Andrei Morales is a 58 y.o. male.     Surgery: Arthroplasty of left Hip  Referring Provider: Dr. Hannah      Recommendations to Proceed withSurgery    Patient is considered to be Low risk for Medium risk procedure.     After evaluation and discussion with patient with emphasis that all surgery has some degree of inherent risk it is determined this procedure is of acceptable risk  medically.    Patient may proceed with planned procedure      Assessment    Pre-operative Medical Evaluation for planned surgery  Recommendations as listed in PLAN section below  Contact surgical nurse  navigator with any questions regarding preoperative plan or schedule.      Problem List Items Addressed This Visit          Musculoskeletal and Integument    AVN (avascular necrosis of bone) (HCC)     Left hip  Failed outpatient conservative measures  Electing for arthroplasty            Other    Prediabetes     Hgb A1c 6.2  Recommend following DM diet  Monitor FBS            Other Visit Diagnoses       Preoperative clearance    -  Primary    Primary osteoarthritis of one hip, left                     Plan:     1. Further preoperative workup as follows:   - none no further testing required may proceed with surgery    2. Medication Management/Recommendations:   - hold aspirin 7 days prior to surgery  - avoid use of NSAID such as motrin, advil, aleve for 7 days prior to surgery  - hold all OTC herbal or nutritional supplements 7 days before surgery    3. Patient requires further consultation with:   No Consults Required    4. Discharge Planning / Barriers to Discharge  none identified - patients has post discharge therapy plan in place, transportation arranged for discharge day, adequate family support at home to assist with discharge to home.        Subjective:           History of  "Present Illness:     Andrei Morales is a 58 y.o. male who presents to the office today for a preoperative consultation at the request of surgeon. The patient understands this is an elective procedure and not emergent. They are electing to undergo planned procedure with an understanding that all surgery has inherent risk. They have worked with their surgeon and failed conservative treatment measures. Today they present for preoperative risk assessment and recommendations for optimization in preparation for surgery.    Pt seen in surgical optimization center for upcoming proposed surgery. They have failed previous conservative measures and have elected surgical intervention.     Pt meets presurgical lab and BMI optimization goals.    Upon interview questioning patient is able to perform greater than 4 METs workload in daily life without any reported cardio-pulmonary symptoms.          ROS: No TIA's or unusual headaches, no dysphagia.  No prolonged cough. No dyspnea or chest pain on exertion.  No abdominal pain, change in bowel habits, black or bloody stools.  No urinary tract or BPH symptoms.  Positive reported pain in arthritic joint. Positive difficulty with gait. No skin rashes or issues.      Objective:    /86   Pulse 86   Ht 5' 6\" (1.676 m)   Wt 66.7 kg (147 lb)   SpO2 97%   BMI 23.73 kg/m²       General Appearance: no distress, conversive  HEENT: PERRLA, conjuctiva normal; oropharynx clear; mucous membranes moist;   Neck:  Supple, no lymphadenopathy or thyromegaly  Lungs: breath sounds normal, normal respiratory effort, no retractions, expiratory effort normal  CV: normal heart sounds S1/S2, PMI normal   ABD: soft non tender, no masses , no hepatic or splenomegaly  EXT: DP pulses intact, no lymphadenopathy, no edema  Skin: normal turgor, normal texture, no rash  Psych: affect normal, mood normal  Neuro: AAOx3        The following portions of the patient's history were reviewed and updated as " "appropriate: allergies, current medications, past family history, past medical history, past social history, past surgical history and problem list.     Past History:       Past Medical History:   Diagnosis Date    Adopted person     Arthritis     Environmental allergies     \"outside allergies sometimes\"    Exercise involving walking     occas    HL (hearing loss)     Left hip pain     and leg    Tinnitus     Use of cane as ambulatory aid     sometimes    Wears glasses     \"magnifiers to read\"    Past Surgical History:   Procedure Laterality Date    HAND SURGERY      VASECTOMY            Social History     Tobacco Use    Smoking status: Never    Smokeless tobacco: Never   Vaping Use    Vaping status: Never Used   Substance Use Topics    Alcohol use: Yes     Alcohol/week: 2.0 standard drinks of alcohol     Types: 2 Standard drinks or equivalent per week     Comment: drinks on weekends    Drug use: Never     Family History   Adopted: Yes   Problem Relation Age of Onset    No Known Problems Mother     No Known Problems Father           Allergies:     Allergies   Allergen Reactions    Codeine Other (See Comments)     Unknown, reaction in childhood--\"cough syrup with codeine\"        Current Medications:     Current Outpatient Medications   Medication Instructions    acetaminophen (TYLENOL) 500 mg, Oral, Every 6 hours PRN    ascorbic acid (VITAMIN C) 500 mg, Oral, 2 times daily    ferrous sulfate 324 mg, Oral, Every other day    folic acid (FOLVITE) 1 mg, Oral, Daily    ibuprofen (MOTRIN) 800 mg tablet take 1 tablet by mouth every 8 hours if needed for mild pain    Multiple Vitamins-Minerals (multivitamin with minerals) tablet 1 tablet, Oral, Daily           PRE-OP WORKSHEET DATA    Assessment of Pre-Operative Risks     MLJ Quality Hard Stops:  BMI (<40) : Estimated body mass index is 23.73 kg/m² as calculated from the following:    Height as of this encounter: 5' 6\" (1.676 m).    Weight as of this encounter: 66.7 kg (147 " lb).    Hgb ( >11):   Lab Results   Component Value Date    HGB 14.9 02/08/2024     HbA1c (<7.5) :   Lab Results   Component Value Date    HGBA1C 6.2 (H) 02/08/2024     GFR (>60) (Less then 45 = Nephrology consult):  Estimated Creatinine Clearance: 82.6 mL/min (by C-G formula based on SCr of 0.88 mg/dL).      Active Decompensated Chronic Conditions which would delay surgery  No acutely decompensated medical issues such as recent CVA, MI, new onset arrhythmia, severe aortic stenosis, CHF, uncontrolled COPD       Exercise Capacity: (if less the 4 mets consider functional assessment via cardiac stress testing or consultation)    Able to walk 2 blocks without symptoms?: Yes  Able to walk 1 flights without symptoms?: Yes    Assessment of intra and post operative respiratory, hemodynamic and thrombotic risks     Prior Anesthesia Reactions: No     Personal history of venous thromboembolic disease? No    History of steroid use > 5 mg for >2 weeks within last year? No      The patient's risk factors for cardiac complications include :  none    Andrei Morales has an IN HOSPITAL cardiac risk of RCI RISK CLASS I (0 risk factors, risk of major cardiac compl. appr. 0.5%) based on RCRI calculator    Cardiac Risk Estimation: per the Revised Cardiac Risk Index (Circ. 100:1043, 1999),        Pre-Op Data Reviewed:       Laboratory Results: I have personally reviewed the pertinent laboratory results/reports     EKG:I have personally reviewed pertinent reports.  . I personally reviewed and interpreted available tracings in the electronic medical record    Normal sinus rhythm  Normal ECG  No previous ECGs available  Confirmed by Andrei Green ([id number]) on 2/8/2024     OLD RECORDS: reviewed old records in the chart review section if EHR on day of visit.    Previous cardiopulmonary studies within the past year:  Echocardiogram: no  Cardiac Catheterization: no  Stress Test: no      Time of visit including pre-visit chart review, visit and  post-visit coordination of plan and care , review of pre-surgical lab work, preparation and time spent documenting note in electronic medical record, time spent face-to-face in physical examination answering patient questions by care team 45 minutes             [facility]

## 2024-02-02 NOTE — H&P (VIEW-ONLY)
Internal Medicine Pre-Operative Evaluation:     Reason for Visit: Pre-operative Evaluation for Risk Stratification and Optimization    Patient ID: Andrei Morales is a 58 y.o. male.     Surgery: Arthroplasty of left Hip  Referring Provider: Dr. Hannah      Recommendations to Proceed withSurgery    Patient is considered to be Low risk for Medium risk procedure.     After evaluation and discussion with patient with emphasis that all surgery has some degree of inherent risk it is determined this procedure is of acceptable risk  medically.    Patient may proceed with planned procedure      Assessment    Pre-operative Medical Evaluation for planned surgery  Recommendations as listed in PLAN section below  Contact surgical nurse  navigator with any questions regarding preoperative plan or schedule.      Problem List Items Addressed This Visit          Musculoskeletal and Integument    AVN (avascular necrosis of bone) (HCC)     Left hip  Failed outpatient conservative measures  Electing for arthroplasty            Other    Prediabetes     Hgb A1c 6.2  Recommend following DM diet  Monitor FBS            Other Visit Diagnoses       Preoperative clearance    -  Primary    Primary osteoarthritis of one hip, left                     Plan:     1. Further preoperative workup as follows:   - none no further testing required may proceed with surgery    2. Medication Management/Recommendations:   - hold aspirin 7 days prior to surgery  - avoid use of NSAID such as motrin, advil, aleve for 7 days prior to surgery  - hold all OTC herbal or nutritional supplements 7 days before surgery    3. Patient requires further consultation with:   No Consults Required    4. Discharge Planning / Barriers to Discharge  none identified - patients has post discharge therapy plan in place, transportation arranged for discharge day, adequate family support at home to assist with discharge to home.        Subjective:           History of  "Present Illness:     Andrei Morales is a 58 y.o. male who presents to the office today for a preoperative consultation at the request of surgeon. The patient understands this is an elective procedure and not emergent. They are electing to undergo planned procedure with an understanding that all surgery has inherent risk. They have worked with their surgeon and failed conservative treatment measures. Today they present for preoperative risk assessment and recommendations for optimization in preparation for surgery.    Pt seen in surgical optimization center for upcoming proposed surgery. They have failed previous conservative measures and have elected surgical intervention.     Pt meets presurgical lab and BMI optimization goals.    Upon interview questioning patient is able to perform greater than 4 METs workload in daily life without any reported cardio-pulmonary symptoms.          ROS: No TIA's or unusual headaches, no dysphagia.  No prolonged cough. No dyspnea or chest pain on exertion.  No abdominal pain, change in bowel habits, black or bloody stools.  No urinary tract or BPH symptoms.  Positive reported pain in arthritic joint. Positive difficulty with gait. No skin rashes or issues.      Objective:    /86   Pulse 86   Ht 5' 6\" (1.676 m)   Wt 66.7 kg (147 lb)   SpO2 97%   BMI 23.73 kg/m²       General Appearance: no distress, conversive  HEENT: PERRLA, conjuctiva normal; oropharynx clear; mucous membranes moist;   Neck:  Supple, no lymphadenopathy or thyromegaly  Lungs: breath sounds normal, normal respiratory effort, no retractions, expiratory effort normal  CV: normal heart sounds S1/S2, PMI normal   ABD: soft non tender, no masses , no hepatic or splenomegaly  EXT: DP pulses intact, no lymphadenopathy, no edema  Skin: normal turgor, normal texture, no rash  Psych: affect normal, mood normal  Neuro: AAOx3        The following portions of the patient's history were reviewed and updated as " "appropriate: allergies, current medications, past family history, past medical history, past social history, past surgical history and problem list.     Past History:       Past Medical History:   Diagnosis Date    Adopted person     Arthritis     Environmental allergies     \"outside allergies sometimes\"    Exercise involving walking     occas    HL (hearing loss)     Left hip pain     and leg    Tinnitus     Use of cane as ambulatory aid     sometimes    Wears glasses     \"magnifiers to read\"    Past Surgical History:   Procedure Laterality Date    HAND SURGERY      VASECTOMY            Social History     Tobacco Use    Smoking status: Never    Smokeless tobacco: Never   Vaping Use    Vaping status: Never Used   Substance Use Topics    Alcohol use: Yes     Alcohol/week: 2.0 standard drinks of alcohol     Types: 2 Standard drinks or equivalent per week     Comment: drinks on weekends    Drug use: Never     Family History   Adopted: Yes   Problem Relation Age of Onset    No Known Problems Mother     No Known Problems Father           Allergies:     Allergies   Allergen Reactions    Codeine Other (See Comments)     Unknown, reaction in childhood--\"cough syrup with codeine\"        Current Medications:     Current Outpatient Medications   Medication Instructions    acetaminophen (TYLENOL) 500 mg, Oral, Every 6 hours PRN    ascorbic acid (VITAMIN C) 500 mg, Oral, 2 times daily    ferrous sulfate 324 mg, Oral, Every other day    folic acid (FOLVITE) 1 mg, Oral, Daily    ibuprofen (MOTRIN) 800 mg tablet take 1 tablet by mouth every 8 hours if needed for mild pain    Multiple Vitamins-Minerals (multivitamin with minerals) tablet 1 tablet, Oral, Daily           PRE-OP WORKSHEET DATA    Assessment of Pre-Operative Risks     MLJ Quality Hard Stops:  BMI (<40) : Estimated body mass index is 23.73 kg/m² as calculated from the following:    Height as of this encounter: 5' 6\" (1.676 m).    Weight as of this encounter: 66.7 kg (147 " lb).    Hgb ( >11):   Lab Results   Component Value Date    HGB 14.9 02/08/2024     HbA1c (<7.5) :   Lab Results   Component Value Date    HGBA1C 6.2 (H) 02/08/2024     GFR (>60) (Less then 45 = Nephrology consult):  Estimated Creatinine Clearance: 82.6 mL/min (by C-G formula based on SCr of 0.88 mg/dL).      Active Decompensated Chronic Conditions which would delay surgery  No acutely decompensated medical issues such as recent CVA, MI, new onset arrhythmia, severe aortic stenosis, CHF, uncontrolled COPD       Exercise Capacity: (if less the 4 mets consider functional assessment via cardiac stress testing or consultation)    Able to walk 2 blocks without symptoms?: Yes  Able to walk 1 flights without symptoms?: Yes    Assessment of intra and post operative respiratory, hemodynamic and thrombotic risks     Prior Anesthesia Reactions: No     Personal history of venous thromboembolic disease? No    History of steroid use > 5 mg for >2 weeks within last year? No      The patient's risk factors for cardiac complications include :  none    Andrei Morales has an IN HOSPITAL cardiac risk of RCI RISK CLASS I (0 risk factors, risk of major cardiac compl. appr. 0.5%) based on RCRI calculator    Cardiac Risk Estimation: per the Revised Cardiac Risk Index (Circ. 100:1043, 1999),        Pre-Op Data Reviewed:       Laboratory Results: I have personally reviewed the pertinent laboratory results/reports     EKG:I have personally reviewed pertinent reports.  . I personally reviewed and interpreted available tracings in the electronic medical record    Normal sinus rhythm  Normal ECG  No previous ECGs available  Confirmed by Andrei Green ([id number]) on 2/8/2024     OLD RECORDS: reviewed old records in the chart review section if EHR on day of visit.    Previous cardiopulmonary studies within the past year:  Echocardiogram: no  Cardiac Catheterization: no  Stress Test: no      Time of visit including pre-visit chart review, visit and  post-visit coordination of plan and care , review of pre-surgical lab work, preparation and time spent documenting note in electronic medical record, time spent face-to-face in physical examination answering patient questions by care team 45 minutes             [facility]

## 2024-02-02 NOTE — TELEPHONE ENCOUNTER
Preoperative Elective Admission Assessment- spoke to patient     Living Situation:    Who does pt live with: spouse  What kind of home: multi-level  How do they enter the home: front  How many levels in home: 2 level, first floor bed and bathroom.   # of steps to enter home: 0 to enter, from side entrance   # of steps to second floor: 12-14 to 2nd floor   Are there handrails: Yes  Are there landings: No  Sleeping arrangement: first/entry floor  Where is Bathroom: First floor 1/2 bath  Where is the tub or shower: Second floor full bathroom with walk in shower     First Floor Setup:   Is there a bathroom: Yes  Where would pt sleep: bed     DME: Cane, patient is getting RW on his own.    We discussed clearing pathways in the home and making sure there is accessibly to use the walker, for example, removing throw rugs.      Patient's Current Level of Function: Ambulates with cane and ADLs: Independent    Post-op Caregiver: spouse  Currently receive any HHC/aides/community supports: No     Post-op Transport: spouse  To/from hospital: spouse  To/from PT 2-3x/week: spouse  Uses community transport now: No     Outpatient Physical Therapy Site:  Site: Dr. Hannah  pre and post-op appts scheduled? No     Medication Management: self  Preferred Pharmacy for Post-op Medications: [id number] - [place], [place] - [address] [place] [address] [[id number]]   Blood Management Vitamin Regimen: Pt confirms taking as prescribed  Post-op anticoagulant: to be determined by surgical team postoperatively     DC Plan: Pt plans to be discharged home    Barriers to DC identified preoperatively: none identified    BMI: 23.40    Patient Education:  Pt educated on post-op pain, early mobilization (POD0), LOS goals, OP PT goals, and preoperative bathing. Patient educated that our goal is to appropriately discharge patient based off their post-op function while striving to maintain maximal independence. The goal is to discharge patient to home and for them  to attend outpatient physical therapy.    Assigned to care team? Yes   no

## 2024-02-02 NOTE — PATIENT INSTRUCTIONS
Contact surgical nurse  navigator with any questions regarding preoperative plan or schedule.  Stop all over the counter supplements, herbal, naturopathic  medications for 1 week prior to surgery UNLESS prescribed by your surgeon  Hold NSAIDS (i.e. advil, alleve, motrin, ibuprofen, celebrex) minimum 7 days prior to surgery  Hold Asprin minimum 7 days prior to surgery  Recommend using Tylenol ( acetaminophen ) 500mg every eight hours during the first week post discharge in conjunction with any additional pain medicine prescribed by your surgeon  Use bowel medicines prescribed by your surgeon ( colace) daily post op during the first 1-2 weeks to avoid post operative constipation issues  Call [phone] with any post discharge concerns or medical issues  The morning of surgery take only the following medication with small sip of water: none

## 2024-02-06 RX ORDER — ACETAMINOPHEN 500 MG
500 TABLET ORAL EVERY 6 HOURS PRN
COMMUNITY

## 2024-02-06 NOTE — PRE-PROCEDURE INSTRUCTIONS
Pre-Surgery Instructions:   Medication Instructions    acetaminophen (TYLENOL) 500 mg tablet Uses PRN- OK to take day of surgery    ascorbic acid (VITAMIN C) 500 MG tablet Hold day of surgery.    ferrous sulfate 324 (65 Fe) mg Hold day of surgery.    folic acid (FOLVITE) 1 mg tablet Hold day of surgery.    ibuprofen (MOTRIN) 800 mg tablet Stop taking 3 days prior to surgery.    Multiple Vitamins-Minerals (multivitamin with minerals) tablet Hold day of surgery.    Medication instructions for day surgery reviewed. Please use only a sip of water to take your instructed medications. Avoid all over the counter vitamins, supplements and NSAIDS for one week prior to surgery per anesthesia guidelines. Tylenol is ok to take as needed.     You will receive a call one business day prior to surgery with an arrival time and hospital directions. If your surgery is scheduled on a Monday, the hospital will be calling you on the Friday prior to your surgery. If you have not heard from anyone by 8pm, please call the hospital supervisor through the hospital  at [phone]. ([place] [phone]).    Do not eat or drink anything after midnight the night before your surgery, including candy, mints, lifesavers, or chewing gum. Do not drink alcohol 24hrs before your surgery. Try not to smoke at least 24hrs before your surgery.       Follow the pre surgery showering instructions as listed in the “My Surgical Experience Booklet” or otherwise provided by your surgeon's office. Do not use a blade to shave the surgical area 1 week before surgery. It is okay to use a clean electric clippers up to 24 hours before surgery. Do not apply any lotions, creams, including makeup, cologne, deodorant, or perfumes after showering on the day of your surgery. Do not use dry shampoo, hair spray, hair gel, or any type of hair products.     No contact lenses, eye make-up, or artificial eyelashes. Remove nail polish, including gel polish, and any  artificial, gel, or acrylic nails if possible. Remove all jewelry including rings and body piercing jewelry.     Wear causal clothing that is easy to take on and off. Consider your type of surgery.    Keep any valuables, jewelry, piercings at home. Please bring any specially ordered equipment (sling, braces) if indicated.    Arrange for a responsible person to drive you to and from the hospital on the day of your surgery. Visitor Guidelines discussed.     Call the surgeon's office with any new illnesses, exposures, or additional questions prior to surgery.    Please reference your “My Surgical Experience Booklet” for additional information to prepare for your upcoming surgery.

## 2024-02-08 ENCOUNTER — OFFICE VISIT (OUTPATIENT)
Dept: LAB | Facility: HOSPITAL | Age: 59
End: 2024-02-08
Payer: COMMERCIAL

## 2024-02-08 ENCOUNTER — APPOINTMENT (OUTPATIENT)
Dept: LAB | Facility: HOSPITAL | Age: 59
End: 2024-02-08
Payer: COMMERCIAL

## 2024-02-08 DIAGNOSIS — M87.00 AVN (AVASCULAR NECROSIS OF BONE) (HCC): ICD-10-CM

## 2024-02-08 DIAGNOSIS — M16.12 PRIMARY OSTEOARTHRITIS OF ONE HIP, LEFT: ICD-10-CM

## 2024-02-08 LAB
ALBUMIN SERPL BCP-MCNC: 4.6 G/DL (ref 3.5–5)
ALP SERPL-CCNC: 65 U/L (ref 34–104)
ALT SERPL W P-5'-P-CCNC: 43 U/L (ref 7–52)
ANION GAP SERPL CALCULATED.3IONS-SCNC: 8 MMOL/L
APTT PPP: 31 SECONDS (ref 23–37)
AST SERPL W P-5'-P-CCNC: 24 U/L (ref 13–39)
ATRIAL RATE: 69 BPM
BASOPHILS # BLD AUTO: 0.06 THOUSANDS/ÂΜL (ref 0–0.1)
BASOPHILS NFR BLD AUTO: 1 % (ref 0–1)
BILIRUB SERPL-MCNC: 0.45 MG/DL (ref 0.2–1)
BUN SERPL-MCNC: 18 MG/DL (ref 5–25)
CALCIUM SERPL-MCNC: 9.9 MG/DL (ref 8.4–10.2)
CHLORIDE SERPL-SCNC: 99 MMOL/L (ref 96–108)
CO2 SERPL-SCNC: 29 MMOL/L (ref 21–32)
CREAT SERPL-MCNC: 0.88 MG/DL (ref 0.6–1.3)
EOSINOPHIL # BLD AUTO: 0.13 THOUSAND/ÂΜL (ref 0–0.61)
EOSINOPHIL NFR BLD AUTO: 2 % (ref 0–6)
ERYTHROCYTE [DISTWIDTH] IN BLOOD BY AUTOMATED COUNT: 12.9 % (ref 11.6–15.1)
EST. AVERAGE GLUCOSE BLD GHB EST-MCNC: 131 MG/DL
GFR SERPL CREATININE-BSD FRML MDRD: 94 ML/MIN/1.73SQ M
GLUCOSE P FAST SERPL-MCNC: 101 MG/DL (ref 65–99)
HBA1C MFR BLD: 6.2 %
HCT VFR BLD AUTO: 45.1 % (ref 36.5–49.3)
HGB BLD-MCNC: 14.9 G/DL (ref 12–17)
IMM GRANULOCYTES # BLD AUTO: 0.02 THOUSAND/UL (ref 0–0.2)
IMM GRANULOCYTES NFR BLD AUTO: 0 % (ref 0–2)
INR PPP: 0.92 (ref 0.84–1.19)
LYMPHOCYTES # BLD AUTO: 1.92 THOUSANDS/ÂΜL (ref 0.6–4.47)
LYMPHOCYTES NFR BLD AUTO: 29 % (ref 14–44)
MCH RBC QN AUTO: 32 PG (ref 26.8–34.3)
MCHC RBC AUTO-ENTMCNC: 33 G/DL (ref 31.4–37.4)
MCV RBC AUTO: 97 FL (ref 82–98)
MONOCYTES # BLD AUTO: 0.8 THOUSAND/ÂΜL (ref 0.17–1.22)
MONOCYTES NFR BLD AUTO: 12 % (ref 4–12)
NEUTROPHILS # BLD AUTO: 3.78 THOUSANDS/ÂΜL (ref 1.85–7.62)
NEUTS SEG NFR BLD AUTO: 56 % (ref 43–75)
NRBC BLD AUTO-RTO: 0 /100 WBCS
P AXIS: 34 DEGREES
PLATELET # BLD AUTO: 396 THOUSANDS/UL (ref 149–390)
PMV BLD AUTO: 9.3 FL (ref 8.9–12.7)
POTASSIUM SERPL-SCNC: 3.8 MMOL/L (ref 3.5–5.3)
PR INTERVAL: 134 MS
PROT SERPL-MCNC: 7.6 G/DL (ref 6.4–8.4)
PROTHROMBIN TIME: 12.6 SECONDS (ref 11.6–14.5)
QRS AXIS: 17 DEGREES
QRSD INTERVAL: 88 MS
QT INTERVAL: 384 MS
QTC INTERVAL: 411 MS
RBC # BLD AUTO: 4.65 MILLION/UL (ref 3.88–5.62)
SODIUM SERPL-SCNC: 136 MMOL/L (ref 135–147)
T WAVE AXIS: 19 DEGREES
VENTRICULAR RATE: 69 BPM
WBC # BLD AUTO: 6.71 THOUSAND/UL (ref 4.31–10.16)

## 2024-02-08 PROCEDURE — 83036 HEMOGLOBIN GLYCOSYLATED A1C: CPT

## 2024-02-08 PROCEDURE — 85025 COMPLETE CBC W/AUTO DIFF WBC: CPT

## 2024-02-08 PROCEDURE — 93005 ELECTROCARDIOGRAM TRACING: CPT

## 2024-02-08 PROCEDURE — 85610 PROTHROMBIN TIME: CPT

## 2024-02-08 PROCEDURE — 36415 COLL VENOUS BLD VENIPUNCTURE: CPT

## 2024-02-08 PROCEDURE — 86900 BLOOD TYPING SEROLOGIC ABO: CPT | Performed by: STUDENT IN AN ORGANIZED HEALTH CARE EDUCATION/TRAINING PROGRAM

## 2024-02-08 PROCEDURE — 80053 COMPREHEN METABOLIC PANEL: CPT

## 2024-02-08 PROCEDURE — 85730 THROMBOPLASTIN TIME PARTIAL: CPT

## 2024-02-08 PROCEDURE — 86850 RBC ANTIBODY SCREEN: CPT | Performed by: STUDENT IN AN ORGANIZED HEALTH CARE EDUCATION/TRAINING PROGRAM

## 2024-02-08 PROCEDURE — 86901 BLOOD TYPING SEROLOGIC RH(D): CPT | Performed by: STUDENT IN AN ORGANIZED HEALTH CARE EDUCATION/TRAINING PROGRAM

## 2024-02-09 ENCOUNTER — OFFICE VISIT (OUTPATIENT)
Age: 59
End: 2024-02-09

## 2024-02-09 ENCOUNTER — LAB REQUISITION (OUTPATIENT)
Dept: LAB | Facility: HOSPITAL | Age: 59
End: 2024-02-09
Payer: COMMERCIAL

## 2024-02-09 VITALS
HEART RATE: 86 BPM | HEIGHT: 66 IN | OXYGEN SATURATION: 97 % | DIASTOLIC BLOOD PRESSURE: 86 MMHG | WEIGHT: 147 LBS | SYSTOLIC BLOOD PRESSURE: 122 MMHG | BODY MASS INDEX: 23.63 KG/M2

## 2024-02-09 DIAGNOSIS — R73.03 PREDIABETES: ICD-10-CM

## 2024-02-09 DIAGNOSIS — M87.00 IDIOPATHIC ASEPTIC NECROSIS OF UNSPECIFIED BONE (HCC): ICD-10-CM

## 2024-02-09 DIAGNOSIS — Z01.818 PREOPERATIVE CLEARANCE: Primary | ICD-10-CM

## 2024-02-09 DIAGNOSIS — M16.12 UNILATERAL PRIMARY OSTEOARTHRITIS, LEFT HIP: ICD-10-CM

## 2024-02-09 DIAGNOSIS — M87.00 AVN (AVASCULAR NECROSIS OF BONE) (HCC): ICD-10-CM

## 2024-02-09 DIAGNOSIS — M16.12 PRIMARY OSTEOARTHRITIS OF ONE HIP, LEFT: ICD-10-CM

## 2024-02-09 LAB
ABO GROUP BLD: NORMAL
BLD GP AB SCN SERPL QL: NEGATIVE
RH BLD: POSITIVE
SPECIMEN EXPIRATION DATE: NORMAL

## 2024-02-14 ENCOUNTER — ANESTHESIA EVENT (OUTPATIENT)
Age: 59
End: 2024-02-14
Payer: COMMERCIAL

## 2024-02-27 PROBLEM — M16.12 PRIMARY OSTEOARTHRITIS OF ONE HIP, LEFT: Status: ACTIVE | Noted: 2024-02-27

## 2024-02-27 RX ORDER — CELECOXIB 200 MG/1
200 CAPSULE ORAL 2 TIMES DAILY
Qty: 60 CAPSULE | Refills: 0 | Status: SHIPPED | OUTPATIENT
Start: 2024-02-27 | End: 2024-03-28

## 2024-02-27 RX ORDER — OXYCODONE HYDROCHLORIDE 5 MG/1
5 TABLET ORAL EVERY 4 HOURS PRN
Qty: 30 TABLET | Refills: 0 | Status: SHIPPED | OUTPATIENT
Start: 2024-02-27

## 2024-02-27 RX ORDER — ACETAMINOPHEN 500 MG
1000 TABLET ORAL EVERY 8 HOURS PRN
Qty: 84 TABLET | Refills: 0 | Status: SHIPPED | OUTPATIENT
Start: 2024-02-27 | End: 2024-03-12

## 2024-02-28 ENCOUNTER — HOSPITAL ENCOUNTER (OUTPATIENT)
Age: 59
Discharge: HOME | End: 2024-02-28
Payer: COMMERCIAL

## 2024-02-28 ENCOUNTER — APPOINTMENT (OUTPATIENT)
Age: 59
End: 2024-02-28
Payer: COMMERCIAL

## 2024-02-28 ENCOUNTER — HOSPITAL ENCOUNTER (OUTPATIENT)
Age: 59
Setting detail: OUTPATIENT SURGERY
Discharge: HOME | End: 2024-02-28
Attending: STUDENT IN AN ORGANIZED HEALTH CARE EDUCATION/TRAINING PROGRAM | Admitting: STUDENT IN AN ORGANIZED HEALTH CARE EDUCATION/TRAINING PROGRAM
Payer: COMMERCIAL

## 2024-02-28 ENCOUNTER — ANESTHESIA (OUTPATIENT)
Age: 59
End: 2024-02-28
Payer: COMMERCIAL

## 2024-02-28 VITALS
WEIGHT: 144.8 LBS | RESPIRATION RATE: 18 BRPM | BODY MASS INDEX: 23.27 KG/M2 | HEART RATE: 100 BPM | SYSTOLIC BLOOD PRESSURE: 143 MMHG | TEMPERATURE: 98 F | HEIGHT: 66 IN | OXYGEN SATURATION: 96 % | DIASTOLIC BLOOD PRESSURE: 93 MMHG

## 2024-02-28 DIAGNOSIS — M16.12 PRIMARY OSTEOARTHRITIS OF ONE HIP, LEFT: Primary | ICD-10-CM

## 2024-02-28 DIAGNOSIS — M87.00 AVN (AVASCULAR NECROSIS OF BONE) (HCC): ICD-10-CM

## 2024-02-28 PROCEDURE — 0054T BONE SRGRY CMPTR FLUOR IMAGE: CPT | Performed by: STUDENT IN AN ORGANIZED HEALTH CARE EDUCATION/TRAINING PROGRAM

## 2024-02-28 PROCEDURE — C1776 JOINT DEVICE (IMPLANTABLE): HCPCS | Performed by: STUDENT IN AN ORGANIZED HEALTH CARE EDUCATION/TRAINING PROGRAM

## 2024-02-28 PROCEDURE — 73501 X-RAY EXAM HIP UNI 1 VIEW: CPT

## 2024-02-28 PROCEDURE — 27130 TOTAL HIP ARTHROPLASTY: CPT

## 2024-02-28 PROCEDURE — 27130 TOTAL HIP ARTHROPLASTY: CPT | Performed by: STUDENT IN AN ORGANIZED HEALTH CARE EDUCATION/TRAINING PROGRAM

## 2024-02-28 PROCEDURE — 97163 PT EVAL HIGH COMPLEX 45 MIN: CPT | Performed by: PHYSICAL THERAPIST

## 2024-02-28 PROCEDURE — 97167 OT EVAL HIGH COMPLEX 60 MIN: CPT

## 2024-02-28 DEVICE — BIOLOX DELTA CERAMIC FEMORAL HEAD +1.5 36MM DIA 12/14 TAPER
Type: IMPLANTABLE DEVICE | Site: HIP | Status: FUNCTIONAL
Brand: BIOLOX DELTA

## 2024-02-28 DEVICE — EMPHASYS POLYETHYLENE LINER AOX NEUTRAL 50MM 36MM
Type: IMPLANTABLE DEVICE | Site: HIP | Status: FUNCTIONAL
Brand: EMPHASYS

## 2024-02-28 DEVICE — EMPHASYS ACETABULAR SHELL THREE-HOLE 50MM CEMENTLESS
Type: IMPLANTABLE DEVICE | Site: HIP | Status: FUNCTIONAL
Brand: EMPHASYS

## 2024-02-28 DEVICE — ACTIS DUOFIX HIP PROSTHESIS (FEMORAL STEM 12/14 TAPER CEMENTLESS SIZE 4 HIGH COLLAR)  CE
Type: IMPLANTABLE DEVICE | Site: HIP | Status: FUNCTIONAL
Brand: ACTIS

## 2024-02-28 RX ORDER — MIDAZOLAM HYDROCHLORIDE 2 MG/2ML
INJECTION, SOLUTION INTRAMUSCULAR; INTRAVENOUS AS NEEDED
Status: DISCONTINUED | OUTPATIENT
Start: 2024-02-28 | End: 2024-02-28

## 2024-02-28 RX ORDER — OXYCODONE HYDROCHLORIDE 5 MG/1
5 TABLET ORAL EVERY 4 HOURS PRN
Status: CANCELLED | OUTPATIENT
Start: 2024-02-28

## 2024-02-28 RX ORDER — CHLORHEXIDINE GLUCONATE ORAL RINSE 1.2 MG/ML
15 SOLUTION DENTAL ONCE
Status: COMPLETED | OUTPATIENT
Start: 2024-02-28 | End: 2024-02-28

## 2024-02-28 RX ORDER — METOCLOPRAMIDE HYDROCHLORIDE 5 MG/ML
10 INJECTION INTRAMUSCULAR; INTRAVENOUS ONCE AS NEEDED
Status: DISCONTINUED | OUTPATIENT
Start: 2024-02-28 | End: 2024-02-28 | Stop reason: HOSPADM

## 2024-02-28 RX ORDER — SODIUM CHLORIDE, SODIUM LACTATE, POTASSIUM CHLORIDE, CALCIUM CHLORIDE 600; 310; 30; 20 MG/100ML; MG/100ML; MG/100ML; MG/100ML
INJECTION, SOLUTION INTRAVENOUS CONTINUOUS PRN
Status: DISCONTINUED | OUTPATIENT
Start: 2024-02-28 | End: 2024-02-28

## 2024-02-28 RX ORDER — TRANEXAMIC ACID 10 MG/ML
1000 INJECTION, SOLUTION INTRAVENOUS ONCE
Status: COMPLETED | OUTPATIENT
Start: 2024-02-28 | End: 2024-02-28

## 2024-02-28 RX ORDER — PROPOFOL 10 MG/ML
INJECTION, EMULSION INTRAVENOUS CONTINUOUS PRN
Status: DISCONTINUED | OUTPATIENT
Start: 2024-02-28 | End: 2024-02-28

## 2024-02-28 RX ORDER — FENTANYL CITRATE 50 UG/ML
INJECTION, SOLUTION INTRAMUSCULAR; INTRAVENOUS AS NEEDED
Status: DISCONTINUED | OUTPATIENT
Start: 2024-02-28 | End: 2024-02-28

## 2024-02-28 RX ORDER — ONDANSETRON 2 MG/ML
4 INJECTION INTRAMUSCULAR; INTRAVENOUS EVERY 6 HOURS PRN
Status: CANCELLED | OUTPATIENT
Start: 2024-02-28

## 2024-02-28 RX ORDER — HYDROMORPHONE HCL/PF 1 MG/ML
0.4 SYRINGE (ML) INJECTION
Status: DISCONTINUED | OUTPATIENT
Start: 2024-02-28 | End: 2024-02-28 | Stop reason: HOSPADM

## 2024-02-28 RX ORDER — SODIUM CHLORIDE, SODIUM LACTATE, POTASSIUM CHLORIDE, CALCIUM CHLORIDE 600; 310; 30; 20 MG/100ML; MG/100ML; MG/100ML; MG/100ML
50 INJECTION, SOLUTION INTRAVENOUS CONTINUOUS
OUTPATIENT
Start: 2024-02-28

## 2024-02-28 RX ORDER — CALCIUM CARBONATE 500 MG/1
1000 TABLET, CHEWABLE ORAL DAILY PRN
Status: CANCELLED | OUTPATIENT
Start: 2024-02-28

## 2024-02-28 RX ORDER — SODIUM CHLORIDE, SODIUM LACTATE, POTASSIUM CHLORIDE, CALCIUM CHLORIDE 600; 310; 30; 20 MG/100ML; MG/100ML; MG/100ML; MG/100ML
100 INJECTION, SOLUTION INTRAVENOUS CONTINUOUS
Status: DISCONTINUED | OUTPATIENT
Start: 2024-02-28 | End: 2024-02-28 | Stop reason: HOSPADM

## 2024-02-28 RX ORDER — HYDROMORPHONE HCL/PF 1 MG/ML
0.5 SYRINGE (ML) INJECTION EVERY 2 HOUR PRN
Status: CANCELLED | OUTPATIENT
Start: 2024-02-28 | End: 2024-03-01

## 2024-02-28 RX ORDER — PROPOFOL 10 MG/ML
INJECTION, EMULSION INTRAVENOUS AS NEEDED
Status: DISCONTINUED | OUTPATIENT
Start: 2024-02-28 | End: 2024-02-28

## 2024-02-28 RX ORDER — ACETAMINOPHEN 325 MG/1
975 TABLET ORAL ONCE
Status: COMPLETED | OUTPATIENT
Start: 2024-02-28 | End: 2024-02-28

## 2024-02-28 RX ORDER — SODIUM CHLORIDE, SODIUM LACTATE, POTASSIUM CHLORIDE, CALCIUM CHLORIDE 600; 310; 30; 20 MG/100ML; MG/100ML; MG/100ML; MG/100ML
125 INJECTION, SOLUTION INTRAVENOUS CONTINUOUS
Status: DISCONTINUED | OUTPATIENT
Start: 2024-02-28 | End: 2024-02-28 | Stop reason: HOSPADM

## 2024-02-28 RX ORDER — VANCOMYCIN HYDROCHLORIDE 1 G/20ML
INJECTION, POWDER, LYOPHILIZED, FOR SOLUTION INTRAVENOUS AS NEEDED
Status: DISCONTINUED | OUTPATIENT
Start: 2024-02-28 | End: 2024-02-28 | Stop reason: HOSPADM

## 2024-02-28 RX ORDER — CEFAZOLIN SODIUM 1 G/50ML
1000 SOLUTION INTRAVENOUS EVERY 8 HOURS
Status: CANCELLED | OUTPATIENT
Start: 2024-02-28 | End: 2024-02-29

## 2024-02-28 RX ORDER — GABAPENTIN 300 MG/1
300 CAPSULE ORAL
Status: CANCELLED | OUTPATIENT
Start: 2024-02-28

## 2024-02-28 RX ORDER — OXYCODONE HYDROCHLORIDE 10 MG/1
10 TABLET ORAL EVERY 4 HOURS PRN
Status: CANCELLED | OUTPATIENT
Start: 2024-02-28

## 2024-02-28 RX ORDER — FENTANYL CITRATE/PF 50 MCG/ML
25 SYRINGE (ML) INJECTION
Status: COMPLETED | OUTPATIENT
Start: 2024-02-28 | End: 2024-02-28

## 2024-02-28 RX ORDER — PHENYLEPHRINE HCL IN 0.9% NACL 1 MG/10 ML
SYRINGE (ML) INTRAVENOUS AS NEEDED
Status: DISCONTINUED | OUTPATIENT
Start: 2024-02-28 | End: 2024-02-28

## 2024-02-28 RX ORDER — CEFAZOLIN SODIUM 2 G/50ML
2000 SOLUTION INTRAVENOUS ONCE
Status: COMPLETED | OUTPATIENT
Start: 2024-02-28 | End: 2024-02-28

## 2024-02-28 RX ORDER — SODIUM CHLORIDE, SODIUM LACTATE, POTASSIUM CHLORIDE, CALCIUM CHLORIDE 600; 310; 30; 20 MG/100ML; MG/100ML; MG/100ML; MG/100ML
1.5 INJECTION, SOLUTION INTRAVENOUS CONTINUOUS
Status: CANCELLED | OUTPATIENT
Start: 2024-02-28

## 2024-02-28 RX ORDER — ONDANSETRON 2 MG/ML
INJECTION INTRAMUSCULAR; INTRAVENOUS AS NEEDED
Status: DISCONTINUED | OUTPATIENT
Start: 2024-02-28 | End: 2024-02-28

## 2024-02-28 RX ORDER — DEXAMETHASONE SODIUM PHOSPHATE 10 MG/ML
INJECTION, SOLUTION INTRAMUSCULAR; INTRAVENOUS AS NEEDED
Status: DISCONTINUED | OUTPATIENT
Start: 2024-02-28 | End: 2024-02-28

## 2024-02-28 RX ORDER — MAGNESIUM HYDROXIDE/ALUMINUM HYDROXICE/SIMETHICONE 120; 1200; 1200 MG/30ML; MG/30ML; MG/30ML
30 SUSPENSION ORAL EVERY 6 HOURS PRN
Status: CANCELLED | OUTPATIENT
Start: 2024-02-28

## 2024-02-28 RX ORDER — CHLORHEXIDINE GLUCONATE 4 G/100ML
SOLUTION TOPICAL DAILY PRN
Status: DISCONTINUED | OUTPATIENT
Start: 2024-02-28 | End: 2024-02-28 | Stop reason: HOSPADM

## 2024-02-28 RX ORDER — ACETAMINOPHEN 325 MG/1
650 TABLET ORAL EVERY 6 HOURS PRN
Status: CANCELLED | OUTPATIENT
Start: 2024-02-28

## 2024-02-28 RX ADMIN — TRANEXAMIC ACID 1000 MG: 10 INJECTION, SOLUTION INTRAVENOUS at 13:57

## 2024-02-28 RX ADMIN — SODIUM CHLORIDE, SODIUM LACTATE, POTASSIUM CHLORIDE, AND CALCIUM CHLORIDE: .6; .31; .03; .02 INJECTION, SOLUTION INTRAVENOUS at 10:54

## 2024-02-28 RX ADMIN — Medication 100 MCG: at 14:23

## 2024-02-28 RX ADMIN — PROPOFOL 120 MCG/KG/MIN: 10 INJECTION, EMULSION INTRAVENOUS at 12:58

## 2024-02-28 RX ADMIN — SODIUM CHLORIDE, SODIUM LACTATE, POTASSIUM CHLORIDE, AND CALCIUM CHLORIDE 125 ML/HR: .6; .31; .03; .02 INJECTION, SOLUTION INTRAVENOUS at 11:06

## 2024-02-28 RX ADMIN — CHLORHEXIDINE GLUCONATE 0.12% ORAL RINSE 15 ML: 1.2 LIQUID ORAL at 11:01

## 2024-02-28 RX ADMIN — TRANEXAMIC ACID 1000 MG: 10 INJECTION, SOLUTION INTRAVENOUS at 13:09

## 2024-02-28 RX ADMIN — ACETAMINOPHEN 325MG 975 MG: 325 TABLET ORAL at 11:01

## 2024-02-28 RX ADMIN — FENTANYL CITRATE 25 MCG: 50 INJECTION INTRAMUSCULAR; INTRAVENOUS at 15:23

## 2024-02-28 RX ADMIN — SODIUM CHLORIDE, SODIUM LACTATE, POTASSIUM CHLORIDE, AND CALCIUM CHLORIDE 100 ML/HR: .6; .31; .03; .02 INJECTION, SOLUTION INTRAVENOUS at 14:50

## 2024-02-28 RX ADMIN — CEFAZOLIN SODIUM 2000 MG: 2 SOLUTION INTRAVENOUS at 12:46

## 2024-02-28 RX ADMIN — FENTANYL CITRATE 50 MCG: 50 INJECTION INTRAMUSCULAR; INTRAVENOUS at 13:04

## 2024-02-28 RX ADMIN — DEXAMETHASONE SODIUM PHOSPHATE 10 MG: 10 INJECTION, SOLUTION INTRAMUSCULAR; INTRAVENOUS at 12:51

## 2024-02-28 RX ADMIN — MIDAZOLAM 2 MG: 1 INJECTION INTRAMUSCULAR; INTRAVENOUS at 12:46

## 2024-02-28 RX ADMIN — PHENYLEPHRINE HYDROCHLORIDE 50 MCG/MIN: 10 INJECTION INTRAVENOUS at 12:58

## 2024-02-28 RX ADMIN — PROPOFOL 50 MG: 10 INJECTION, EMULSION INTRAVENOUS at 12:58

## 2024-02-28 RX ADMIN — FENTANYL CITRATE 25 MCG: 50 INJECTION INTRAMUSCULAR; INTRAVENOUS at 15:32

## 2024-02-28 RX ADMIN — MEPIVACAINE HYDROCHLORIDE 3.5 ML: 15 INJECTION, SOLUTION EPIDURAL; INFILTRATION at 12:50

## 2024-02-28 RX ADMIN — ONDANSETRON 4 MG: 2 INJECTION INTRAMUSCULAR; INTRAVENOUS at 12:51

## 2024-02-28 NOTE — PLAN OF CARE
Problem: OCCUPATIONAL THERAPY ADULT  Goal: Performs self-care activities at highest level of function for planned discharge setting.  See evaluation for individualized goals.  Description: Treatment Interventions: ADL retraining, Functional transfer training, UE strengthening/ROM, Endurance training, Patient/family training, Equipment evaluation/education, Continued evaluation, Energy conservation, Activityengagement          See flowsheet documentation for full assessment, interventions and recommendations.   Note: Limitation: Decreased ADL status, Decreased endurance, Decreased self-care trans, Decreased high-level ADLs  Prognosis: Good  Assessment: Pt is a 58 y.o. male seen for OT evaluation s/p adm to  [facility]  on [facility]/28/2024 w/ Primary osteoarthritis of one hip, left . Comorbidities affecting pt’s functional performance include a significant PMH of AVN, osteoarthritis. Pt with active OT orders and activity orders for Activity beginning POD #0. WBAT LLE. Pt lives with spouse in multilevel home with 0 DARCI. Pt has walk-in shower and 1/2 bath on main. Plans to stay on main level with bed/bath.  At baseline, pt was independent with all ADLs/IADLs. Start of session supine: 145/89.  Pt completed supine to sit with supervision. Pt completed sit to stand with verbal cues for hand placement and RW. Pt completed functional mobility household distances with RW. Pt completed toileting with supervision and use of RW for stability. Pt stand to sit with supervision. Pt completed LB dressing seated EOB and standing with RW to pull over hips with Jose Martin. Pt ended session EOB /93. Pt educated on bathing, dressing and car transfer for independence at home. Upon evaluation, pt currently requires supervision for UB ADLs, Jose Martin for LB ADLs, supervision for toileting, supervision for bed mobility, supervision for functional mobility, and supervision for transfers 2* the following deficits impacting occupational performance:  weakness, decreased balance, decreased activity tolerance, increased pain, and orthopedic restrictions. These impairments, as well at pt’s personal factors of: DARCI home environment, steps within home environment, difficulty performing ADLs, difficulty performing IADLs, difficulty performing transfers/mobility, WBS, fall risk , and new use of AD for functional transfers/mobility limit pt’s ability to safely engage in all baseline areas of occupation. Based on the aforementioned OT evaluation, functional performance deficits, and assessments, pt has been identified as a high complexity evaluation. Pt to continue to benefit from continued acute OT services during hospital stay to address defined deficits and to maximize level of functional independence in the following Occupational Performance areas: grooming, bathing/shower, toilet hygiene, dressing, medication management, health maintenance, functional mobility, community mobility, clothing management, household maintenance, and job performance/volunteering. From OT standpoint, recommend minimum resource intensity (OPPT) upon D/C. OT will continue to follow pt 3-5x/wk.     Rehab Resource Intensity Level, OT: III (Minimum Resource Intensity) (OPPT)

## 2024-02-28 NOTE — PHYSICAL THERAPY NOTE
"        PT EVALUATION    Pt. Name: Andrei Morales  Pt. Age: 58 y.o.  MRN: [id number]  LENGTH OF STAY: 0    Patient Active Problem List   Diagnosis    AVN (avascular necrosis of bone) (HCC)    Osteoarthritis of metacarpophalangeal (MCP) joint    Prediabetes    Primary osteoarthritis of one hip, left       Admitting Diagnoses:   AVN (avascular necrosis of bone) (HCC) [M87.00]  Primary osteoarthritis of one hip, left [M16.12]    Past Medical History:   Diagnosis Date    Adopted person     Arthritis     Environmental allergies     \"outside allergies sometimes\"    Exercise involving walking     occas    HL (hearing loss)     Left hip pain     and leg    Tinnitus     Use of cane as ambulatory aid     sometimes    Wears glasses     \"magnifiers to read\"       Past Surgical History:   Procedure Laterality Date    HAND SURGERY      VASECTOMY         Imaging Studies:  XR hip/pelv 1 vw left if performed    (Results Pending)         02/28/24 1710   PT Last Visit   PT Visit Date 02/28/24   Note Type   Note type Evaluation   Pain Assessment   Pain Assessment Tool 0-10   Pain Score 5   Pain Location/Orientation Orientation: Left;Location: Hip   Patient's Stated Pain Goal No pain   Hospital Pain Intervention(s) Cold applied;Repositioned;Ambulation/increased activity;Emotional support;Elevated;Rest   Restrictions/Precautions   Weight Bearing Precautions Per Order Yes   LLE Weight Bearing Per Order WBAT   Other Precautions WBS;Multiple lines;Telemetry;Fall Risk;Pain   Home Living   Type of Home House   Home Layout Multi-level;Performs ADLs on one level;Able to live on main level with bedroom/bathroom;Stairs to enter without rails  (1+1 DARCI)   Bathroom Shower/Tub Walk-in shower   Bathroom Toilet Standard   Bathroom Equipment Commode   Home Equipment Cane;Walker   Prior Function   Level of [place] Independent with functional mobility;Independent with ADLs;Independent with IADLS   Lives With Spouse   Receives Help From Family "   IADLs Independent with driving;Independent with meal prep;Independent with medication management   Falls in the last 6 months 0   Vocational Full time employment  (dispatcher)   General   Family/Caregiver Present Yes   Cognition   Overall Cognitive Status WFL   Arousal/Participation Alert   Orientation Level Oriented X4   Following Commands Follows all commands and directions without difficulty   Comments Pt pleasant and cooperative to work with PT   RUE Assessment   RUE Assessment X  (see OT note)   LUE Assessment   LUE Assessment X  (see OT note)   RLE Assessment   RLE Assessment WFL   LLE Assessment   LLE Assessment X  (did not asses hip MMT due to post op. Able to flex knee and ankle through normal ROM)   Light Touch   RLE Light Touch Grossly intact   LLE Light Touch Grossly intact   Bed Mobility   Supine to Sit 5  Supervision   Additional items LE management;Increased time required;Verbal cues   Sit to Supine Unable to assess   Additional Comments Pt left seated at bedside at end of session   Transfers   Sit to Stand 5  Supervision   Additional items Assist x 1;Increased time required;Verbal cues   Stand to Sit 5  Supervision   Additional items Assist x 1;Increased time required;Verbal cues   Additional Comments RW, cues for hand placement. VITALS: supine /89, seated EOB /89, seated EOB post activity /93   Ambulation/Elevation   Gait pattern Improper Weight shift;Antalgic;Decreased L stance;Steppage;Foward flexed;Short stride;Excessively slow;Decreased heel strike;Decreased toe off   Gait Assistance 5  Supervision   Additional items Verbal cues   Assistive Device Rolling walker   Distance 70'x2   Stair Management Assistance 5  Supervision   Additional items Verbal cues;Increased time required   Stair Management Technique No rails;Step to pattern;Foreward;With walker   Number of Stairs 2   Ambulation/Elevation Additional Comments cues for LE sequencing during stair training and safety with RW  during gait, good carryover   Balance   Static Sitting Good   Dynamic Sitting Fair +   Static Standing Fair   Dynamic Standing Fair -   Ambulatory Fair -   Endurance Deficit   Endurance Deficit No   Activity Tolerance   Activity Tolerance Patient tolerated treatment well   Medical Staff Made Aware OT GELA Kothari Karen   Nurse Made Aware yes   Assessment   Prognosis Good   Problem List Decreased range of motion;Decreased strength;Decreased endurance;Impaired balance;Decreased mobility;Orthopedic restrictions;Pain   Assessment Pt is a 58 y.o. male who [facility] s/p L CALEB, anterior approach done by Dr. Hannah. Precautions include WBAT on L LE. Pt has a past medical history of Adopted person, Arthritis, HL, Left hip pain. Pt greeted at bedside for PT evaluation on 02/28/24. Pt referred to PT for functional mobility evaluation & D/C planning w/ orders of activity beginning POD #0 and activity as tolerated. PTA, pt reports being I w/o AD and I w/ IADLs. Personal factors affecting pt at time of IE include: lives in 2 story house. Please find objective findings from PT assessment regarding body systems outlined above with impairments and limitations including weakness, decreased ROM, impaired balance, decreased endurance, gait deviations, pain, decreased activity tolerance, fall risk, and orthopedic restrictions.  Please see flow sheet above for objective findings and level of assistance required for safe completion of functional tasks. Pt was able to perform supine to sit with S and use of bedrails. Pt performed sit<>stand with S and RW. Vitals taken t/o evaluation, please see flowsheet for objective data. Pt was able to ambulate 70'x2 with S and RW. Pt performed 2 steps with S and use of RW (no rails) to simulate home set up. Pt needed cues for proper LE sequencing during stair training, but had good carryover. Pt was left seated at bedside with wife, RN aware and updated. Reviewed car transfer with  patient and wife, no questions at this time. Pt demonstrated dec endurance and tolerance to activity. Denies reports of dizziness or SOB t/o session. Patient was left supine in bed with call bell and all needs within reach. Pt was educated on fall precautions and reinforced w/ good understanding. Based on pt presentation and impaired function, pt would benefit from level III, (minimal resource intensity) at D/C. From PT/mobility standpoint, pt would benefit from OPPT to address deficits as defined above and maximize level of independence and return pt to PLOF. HEP given and reviewed with patient, no questions at this time. CM to follow. Nsg staff to continue to mobilized pt (OOB in chair for all meals & ambulate in room/unit) as tolerated to prevent further decline in function. Nsg notified. Co-eval performed with OT to complete this evaluation for the pts best interest given pts medical complexity and functional level.   Barriers to Discharge None   Goals   Patient Goals to go home   STG Expiration Date 03/06/24   Short Term Goal #1 1).  Pt will perform bed mobility with Lauren demonstrating appropriate technique 100% of the time in order to improve function.2)  Perform all transfers with Lauren demonstrating safe and appropriate technique 100% of the time in order to improve ability to negotiate safely in home environment.3) Amb with least restrictive AD > 200'x1 with Lauren in order to demonstrate ability to negotiate in home environment.4)  Improve overall strength and balance 1/2 grade in order to optimize ability to perform functional tasks and reduce fall risk.5) Increase activity tolerance to 45 minutes in order to improve endurance to functional tasks.6)  Negotiate stairs using most appropriate technique and Lauren in order to be able to negotiate safely in home environment. 7) PT for ongoing patient and family/caregiver education, DME needs and d/c planning in order to promote highest level of function in least  restrictive environment.   Plan   Treatment/Interventions Functional transfer training;LE strengthening/ROM;Elevations;Therapeutic exercise;Endurance training;Patient/family training;Gait training;Spoke to nursing;OT;Family   PT Frequency Twice a day  (PRN)   Discharge Recommendation   Rehab Resource Intensity Level, PT III (Minimum Resource Intensity)   Equipment Recommended Walker  (Pt owns)   Additional Comments The patient's AM-PAC Basic Mobility Inpatient Short Form Raw Score is 19. A Raw score of greater than 16 suggests the patient may benefit from discharge to home. Please also refer to the recommendation of the Physical Therapist for safe discharge planning.   AM-PAC Basic Mobility Inpatient   Turning in Flat Bed Without Bedrails 4   Lying on Back to Sitting on Edge of Flat Bed Without Bedrails 3   Moving Bed to Chair 3   Standing Up From Chair Using Arms 3   Walk in Room 3   Climb 3-5 Stairs With Railing 3   Basic Mobility Inpatient Raw Score 19   Basic Mobility Standardized Score 42.48   Highest Level Of Mobility   JH-HLM Goal 6: Walk 10 steps or more   JH-HLM Achieved 7: Walk 25 feet or more   End of Consult   Patient Position at End of Consult All needs within reach;Seated edge of bed   End of Consult Comments Pt stable, left at EOB with wife, RN updated and aware     Hx/personal factors: co-morbidities, pain, WB restrictions, and fall risk  Examination: dec mobility, dec balance, dec endurance, dec amb, risk for falls, pain, assessed body system, balance, endurance, amb, D/C disposition & fall risk, WB restrictions, impairements in locomotion, musculoskeletal, balance, endurance, posture, coordination  Clinical: unpredictable (risk for falls, POD #0, and pain mgt)  Complexity: high       Katy Gomez, PT

## 2024-02-28 NOTE — OP NOTE
OPERATIVE REPORT  PATIENT NAME: Andrei Morales  : 1965  MRN: [id number]  Pt Location:  [facility] OR ROOM 04    Surgery Date: 2024    Surgeons and Role:     * Justin Hannah MD - Primary     * Christie Hernandez PA-C - Assisting     Preop Diagnosis:  AVN (avascular necrosis of bone) (HCC) [M87.00]  Primary osteoarthritis of one hip, left [M16.12]    Post-Op Diagnosis Codes:     * AVN (avascular necrosis of bone) (HCC) [M87.00]     * Primary osteoarthritis of one hip, left [M16.12]    Procedure(s):  Left - ARTHROPLASTY HIP TOTAL ANTERIOR. LEFT. SAMEDAY DISCHARGE, WITH NAVIGATION    Specimens:  * No specimens in log *    Estimated Blood Loss:   Minimal    Drains:  * No LDAs found *    Anesthesia Type:   Spinal     Operative Indications:  AVN (avascular necrosis of bone) (HCC) [M87.00]  Primary osteoarthritis of one hip, left [M16.12]  See clinic note for complete list of indications    Operative Findings:  See complete op note below    Complications:   None    Hip Approach: Direct anterior    Procedure and Technique:  IMPLANTS:  1. DePuy Emphasys cup, 50 mm outer diameter.  2. DePuy Actis size 4 H/O femoral stem.  3. DePuy neutral acetabular liner, inner diameter 36mm  4. Ceramic femoral head 36mm +1.5mm      DESCRIPTION OF OPERATION:   After adequate anesthesia was induced, the patient was placed on the Brohman table in the supine position. Care was taken to pad all bony prominences including the boots and perineal post. Two 10/15 drapes were used to demarcate the surgical field. The left hip, thigh, and groin were then prepped and draped in the usual sterile fashion. Perpendicular lines on the skin were placed to aid skin closure. Ioban was placed on all exposed skin. A time out was performed verifying the correct patient, correct limb, and correct procedure and all attendees in the room were in agreement with proceeding.     An incision was made with a #10 scalpel beginning roughly 2 cm lateral and distal to the  ASIS and extending for a length of roughly 10 cm directed toward the fibular head.  Subcutaneous fat was also incised with a #10 scalpel to the level of the tensor fascia.  A new #10 scalpel was then used to incise the fascia of the TFL in line with the incision.  The TFL muscle belly was bluntly dissected from the TFL fascia developing a plane between the TFL and sartorius.  A large Gelpi retractor was placed and electrocautery was used to isolate the a sending branches of the lateral femoral circumflex artery.  Once these arterial branches had been coagulated, the deep fascia of the TFL compartment was then incised with electrocautery creating a plane between the gluteus medius and the rectus femoris.  Precapsular fat was then excised being sure to maintain hemostasis and electrocautery was used to perform an anterior capsulectomy.  An oscillating saw was then used to create a femoral neck osteotomy from the piriformis fossa to roughly 1 fingerbreadth superior to the lesser trochanter.  A corkscrew was inserted up the medullary canal of the femoral head and the femoral head was removed without trauma to the surrounding tissues.  Anterior and posterior acetabular retractors were then placed and a long handled knife equipped with a #10 scalpel blade was used to excise any remaining labrum.  The acetabulum was then sequentially reamed to a size which was appropriate and a final acetabular cup was impacted under fluoroscopic guidance.  The cup was found to have excellent stability and a final acetabular liner was secured into the cup.    Our attention then turned to the femur.  Remaining lateral capsule was excised being sure to preserve the abductors as well as the short external rotators of the hip.  Using a combination of retractors and bone hook, the proximal femur was delivered anteriorly allowing for appropriate visualization.  The lateral femoral neck was removed with a box cutting osteotome, the canal was  sounded and sequential broaching began.  Once a broach of appropriate size was needed, a trial attached and the hip was reduced.  An x-ray of the pelvis was obtained and navigation software was utilized to confirm implant positioning as well as limb length quality.  Finding that the components were in acceptable position and that limb length had been restored, the hip was once again dislocated and the trial femoral components removed.  A final femoral stem was impacted down the femoral shaft and the final femoral head was impacted onto a dry trunnion.  The hip was then reduced. Local anesthetic was then infiltrated to the hip capsule and pericapsular tissues.    The wound was thoroughly irrigated with dilute Betadine followed by sterile saline.  Vancomycin powder was then introduced into the hip joint and the fascia was closed with a running #1 barbed suture.  Skin was closed with 2-0 Vicryl in interrupted fashion and the skin was closed with a 3-0 Monocryl and skin adhesive.  A sterile Mepilex dressing was then applied, anesthesia was discontinued, the patient was transferred to a stretcher and transported to the PACU in stable condition.    No competent resident was available to assist in the case. Christie Hernandez PA-c was necessary for safe positioning, prepping and draping and wound closure.     I was present for the entire procedure.        SIGNATURE: Justin Hannah MD  DATE: February 28, 2024  TIME: 4:52 PM

## 2024-02-28 NOTE — ANESTHESIA PROCEDURE NOTES
Spinal Block    Start time: 2/28/2024 12:50 PM  Reason for block: primary anesthetic  Staffing  Performed by: Rosemary Rivas CRNA  Authorized by: Oniel Bobby MD    Preanesthetic Checklist  Completed: patient identified, IV checked, site marked, risks and benefits discussed, surgical consent, monitors and equipment checked, pre-op evaluation and timeout performed  Spinal Block  Patient position: sitting  Prep: ChloraPrep  Patient monitoring: heart rate, cardiac monitor, continuous pulse ox and frequent blood pressure checks  Approach: midline  Location: L3-4  Injection technique: single-shot  Needle  Needle gauge: 25 G  Needle length: 10 cm  Assessment  Sensory level: T4  Events: cerebrospinal fluid  Injection Assessment:  positive aspiration for clear CSF, no paresthesia on injection and negative aspiration for heme.  Post-procedure:  site cleaned

## 2024-02-28 NOTE — DISCHARGE INSTR - AVS FIRST PAGE
Justin Hannah MD  Adult Reconstruction Specialist   [facility]  Office Phone: [phone]    Discharge Instructions - Hip Replacement        What to Expect/Activity  You are weight bearing as tolerated to your operative leg unless otherwise instructed. Use your walker or crutches as needed. It is important to get up and walk for 10 minutes every hour, if possible.  Initial recovery therapy is focused on walking. If in your follow-up a referral to formal physical therapy is required, this will be provided based on your recovery.  You may sleep however you would like. Many patients feel more comfortable with a pillow between their legs and find it uncomfortable to lay on the operative side. If you do roll on that side at night you will not damage the replacement.  You may use a regular or elevated toilet seat, whichever is more comfortable.  We do not routinely require any specific precautions in terms of positioning of your leg and if so this will be taught to you by the physical therapists at the hospital. This means that you can dress as you are comfortable, including shoes and socks. You can bend down carefully to get items from the floor.   Swelling and discomfort causes pain. Elevate your surgical leg on 1-2 pillows placed behind your ankle/calf throughout the day, especially when you are laying down.  Please use ice packs for 20-30 minutes every 1-2 hours for 48-72 hours on the operative hip. Please make sure there is a barrier directly between your skin and your ice/ice pack.  Please use incentive spirometer 10 times per hour while awake     Dressing/Wound Care/Bathing  There is a surgical dressing over your incision that stays in place for 5 days after surgery. After this they may be taken off. You may start showering at this.   Please pat the dressing dry. If you notice the dressing appears saturated or is starting to come off, please replace with a dry dressing.   Keep  the dressing on until your follow-up in the office.   There may be dried blood around the incision. It is ok to continue showering after removing the dressing but do not scrub the incision. Pat incision dry.  Do not place any creams, ointments or gels on or around the incision.  No baths, swimming or submerging until cleared     Pain Management/Medications  You may resume your usual medications.  Please take the following medications:  Anti-coagulation (blood clot prevention) - Aspirin 81 mg, 1 tablet twice daily for 6 weeks  Pain medication:  Narcotic Medication: Oxycodone 5 mg, 1 tablet every 4-6 hours for severe pain  NSAID (Anti-inflammatory): Celebrex 200 mg, 1 tablet twice a day as needed for mild to moderate pain  Tylenol 1000 mg, 1 tablet every 8 hours as needed for mild to moderate pain  If you have questions or pain concerns, please contact the office. Pain medication cannot remove all post-operative pain.    Follow up/Call if:  The findings of your surgery will be explained to you and your family immediately after surgery. However, in the post-operative period, during recovery from anesthesia you may not fully remember or fully understand what was said. We will go over this again when you return for your post-op appointment.  Please contact Dr. Hannah's office if you experience the following:  Excessive bleeding (bleeding through your dressing)  Fever greater than 101 degrees F after the first 2 days (a slight fever is normal the first few days after surgery)  Persistent nausea or vomiting  Decreased sensation or discoloration of the operative limb  Pain or swelling that is getting worse and not better with medication    Office Contact: [phone]

## 2024-02-28 NOTE — OCCUPATIONAL THERAPY NOTE
"    Occupational Therapy Evaluation     Patient Name: Andrei Morales  Today's Date: 2/28/2024  Problem List  Principal Problem:    Primary osteoarthritis of one hip, left    Past Medical History  Past Medical History:   Diagnosis Date    Adopted person     Arthritis     Environmental allergies     \"outside allergies sometimes\"    Exercise involving walking     occas    HL (hearing loss)     Left hip pain     and leg    Tinnitus     Use of cane as ambulatory aid     sometimes    Wears glasses     \"magnifiers to read\"     Past Surgical History  Past Surgical History:   Procedure Laterality Date    HAND SURGERY      VASECTOMY             02/28/24 1651   OT Last Visit   OT Visit Date 02/28/24   Note Type   Note type Evaluation   Additional Comments Pt greeted supine in bed agreeable to OT evaluation   Pain Assessment   Pain Assessment Tool 0-10   Pain Score 5   Pain Location/Orientation Orientation: Left;Location: Hip   Restrictions/Precautions   Weight Bearing Precautions Per Order Yes   LLE Weight Bearing Per Order WBAT   Other Precautions WBS;Fall Risk;Pain;Multiple lines;Telemetry   Home Living   Type of Home House   Home Layout Multi-level;Performs ADLs on one level;Able to live on main level with bedroom/bathroom  (2 DARCI)   Bathroom Shower/Tub Walk-in shower   Bathroom Toilet Standard   Bathroom Equipment Commode   Bathroom Accessibility Accessible   Home Equipment Walker;Cane   Prior Function   Level of [place] Independent with ADLs;Independent with functional mobility;Independent with IADLS   Lives With Spouse   Receives Help From Family   IADLs Independent with driving;Independent with meal prep;Independent with medication management   Falls in the last 6 months 0   Vocational Full time employment   Comments (+)    Lifestyle   Autonomy Independent with all ADLs/IADLs, no AD use at baseline   Reciprocal Relationships Spouse   Service to Others FTE dispatcher   Intrinsic Gratification in a band "   General   Family/Caregiver Present Yes   ADL   Where Assessed Edge of bed   Eating Assistance 6  Modified independent   Grooming Assistance 5  Supervision/Setup   UB Bathing Assistance 5  Supervision/Setup   LB Bathing Assistance 5  Supervision/Setup   UB Dressing Assistance 5  Supervision/Setup   LB Dressing Assistance 4  Minimal Assistance   LB Dressing Deficit Setup;Verbal cueing;Supervision/safety;Increased time to complete   Toileting Assistance  5  Supervision/Setup   Bed Mobility   Supine to Sit 5  Supervision   Additional items Increased time required;Verbal cues   Additional Comments inc verbal cues for hand placement and safe technique   Transfers   Sit to Stand 5  Supervision   Additional items Increased time required;Verbal cues;HOB elevated  (RW)   Stand to Sit 5  Supervision   Additional items Increased time required;Verbal cues;HOB elevated  (RW)   Additional Comments verbal cues for hand placement   Functional Mobility   Functional Mobility 5  Supervision   Additional Comments supervision with RW functional household distances   Additional items Rolling walker   Balance   Static Sitting Good   Dynamic Sitting Fair +   Static Standing Fair   Dynamic Standing Fair -   Ambulatory Fair -   Activity Tolerance   Activity Tolerance Patient tolerated treatment well;Patient limited by pain   Medical Staff Made Aware PT Ali, Pt seen for co-evaluation with skilled Physical Therapy due to clinically unstable presentation , medical complexity, fall risk, functional balance, limited activity tolerance whish is a decline from PLOF and may impact overall safety.   Nurse Made Aware GELA PÉREZ Assessment   RUE Assessment WFL   LUE Assessment   LUE Assessment WFL   Hand Function   Gross Motor Coordination Functional   Fine Motor Coordination Functional   Cognition   Overall Cognitive Status WFL   Arousal/Participation Alert;Cooperative   Attention Within functional limits   Orientation Level Oriented X4   Memory  Within functional limits   Following Commands Follows one step commands without difficulty   Comments Cooperative and motivated to go home   Assessment   Limitation Decreased ADL status;Decreased endurance;Decreased self-care trans;Decreased high-level ADLs   Prognosis Good   Assessment Pt is a 58 y.o. male seen for OT evaluation s/p adm to  [facility]  on 2/28/2024 w/ Primary osteoarthritis of one hip, left . Comorbidities affecting pt’s functional performance include a significant PMH of AVN, osteoarthritis. Pt with active OT orders and activity orders for Activity beginning POD #0. WBAT LLE. Pt lives with spouse in multilevel home with 0 DARCI. Pt has walk-in shower and 1/2 bath on main. Plans to stay on main level with bed/bath.  At baseline, pt was independent with all ADLs/IADLs. Start of session supine: 145/89.  Pt completed supine to sit with supervision. Pt completed sit to stand with verbal cues for hand placement and RW. Pt completed functional mobility household distances with RW. Pt completed toileting with supervision and use of RW for stability. Pt stand to sit with supervision. Pt completed LB dressing seated EOB and standing with RW to pull over hips with Jose Martin. Pt ended session EOB /93. Pt educated on bathing, dressing and car transfer for independence at home. Upon evaluation, pt currently requires supervision for UB ADLs, Jose Martin for LB ADLs, supervision for toileting, supervision for bed mobility, supervision for functional mobility, and supervision for transfers 2* the following deficits impacting occupational performance: weakness, decreased balance, decreased activity tolerance, increased pain, and orthopedic restrictions. These impairments, as well at pt’s personal factors of: DARCI home environment, steps within home environment, difficulty performing ADLs, difficulty performing IADLs, difficulty performing transfers/mobility, WBS, fall risk , and new use of AD for functional  transfers/mobility limit pt’s ability to safely engage in all baseline areas of occupation. Based on the aforementioned OT evaluation, functional performance deficits, and assessments, pt has been identified as a high complexity evaluation. Pt to continue to benefit from continued acute OT services during hospital stay to address defined deficits and to maximize level of functional independence in the following Occupational Performance areas: grooming, bathing/shower, toilet hygiene, dressing, medication management, health maintenance, functional mobility, community mobility, clothing management, household maintenance, and job performance/volunteering. From OT standpoint, recommend minimum resource intensity (OPPT) upon D/C. OT will continue to follow pt 3-5x/wk.   Goals   STG Time Frame 3-5   Short Term Goal #1 Pt will improve activity tolerance to G for min 30 min treatment sessions for increase engagement in functional tasks   Short Term Goal #2 Pt will complete bed mobility at a mod I level w/ G balance/safety demonstrated to decrease caregiver assistance required   Short Term Goal  Pt will complete LB dressing/self care w/ mod I using adaptive device and DME as needed   LTG Time Frame 7-10   Long Term Goal #1 Pt will complete toileting w/ mod I w/ G hygiene/thoroughness using DME as needed   Long Term Goal #2 Pt will improve functional transfers to mod I on/off all surfaces using DME as needed w/ G balance/safety   Long Term Goal Pt will improve functional mobility during ADL/IADL/leisure tasks to mod I using DME as needed w/ G balance/safety   Plan   Treatment Interventions ADL retraining;Functional transfer training;UE strengthening/ROM;Endurance training;Patient/family training;Equipment evaluation/education;Continued evaluation;Energy conservation;Activityengagement   Goal Expiration Date 03/06/24   OT Treatment Day 0   OT Frequency 3-5x/wk   Discharge Recommendation   Rehab Resource Intensity Level, OT III  (Minimum Resource Intensity)  (OPPT)   Additional Comments  The patient's raw score on the -PAC Daily Activity Inpatient Short Form is 21  . A raw score of greater than or equal to 19 suggests the patient may benefit from discharge to home. Please refer to the recommendation of the Occupational Therapist for safe discharge planning.   AM-PAC Daily Activity Inpatient   Lower Body Dressing 3   Bathing 3   Toileting 3   Upper Body Dressing 4   Grooming 4   Eating 4   Daily Activity Raw Score 21   Daily Activity Standardized Score (Calc for Raw Score >=11) 44.27   AM-PAC Applied Cognition Inpatient   Following a Speech/Presentation 4   Understanding Ordinary Conversation 4   Taking Medications 4   Remembering Where Things Are Placed or Put Away 4   Remembering List of 4-5 Errands 4   Taking Care of Complicated Tasks 4   Applied Cognition Raw Score 24   Applied Cognition Standardized Score 62.21   End of Consult   Education Provided Yes;Family or social support of family present for education by provider   Patient Position at End of Consult Seated edge of bed;All needs within reach   Nurse Communication Nurse aware of consult   End of Consult Comments Pt seated EOB at end of session. Call bell and phone within reach. All needs met and pt reports no further questions for OT at this time.   Saniya Washington, OT

## 2024-02-28 NOTE — INTERVAL H&P NOTE
H&P reviewed. After examining the patient I find no changes in the patients condition since the H&P had been written.    Vitals:    02/28/24 1515   BP: 109/70   Pulse: 72   Resp: 18   Temp:    SpO2: 97%

## 2024-02-28 NOTE — ANESTHESIA PREPROCEDURE EVALUATION
Procedure:  ARTHROPLASTY HIP TOTAL ANTERIOR, LEFT, SAMEDAY DISCHARGE (Left: Hip)     - denies any chest pain, palpitations, shortness of breath, syncope, lightheadedness, seizures   - denies any recent infectious symptoms such as fevers, chills, cough   - denies taking any anticoagulation medications or any issues with bleeding, bruising, clotting    EKG (02/08/24):  NSR, HR 69bpm, Qtc 411    Relevant Problems   ANESTHESIA (within normal limits)      CARDIO (within normal limits)      ENDO (within normal limits)      GI/HEPATIC (within normal limits)      /RENAL (within normal limits)      GYN (within normal limits)      HEMATOLOGY (within normal limits)      MUSCULOSKELETAL   (+) Osteoarthritis of metacarpophalangeal (MCP) joint   (+) Primary osteoarthritis of one hip, left      NEURO/PSYCH (within normal limits)      PULMONARY (within normal limits)      Musculoskeletal and Integument   (+) AVN (avascular necrosis of bone) (HCC)      Lab Results   Component Value Date    WBC 6.71 02/08/2024    HGB 14.9 02/08/2024    HCT 45.1 02/08/2024    MCV 97 02/08/2024     (H) 02/08/2024     Lab Results   Component Value Date    SODIUM 136 02/08/2024    K 3.8 02/08/2024    CL 99 02/08/2024    CO2 29 02/08/2024    AGAP 8 02/08/2024    BUN 18 02/08/2024    CREATININE 0.88 02/08/2024    GLUF 101 (H) 02/08/2024    CALCIUM 9.9 02/08/2024    AST 24 02/08/2024    ALT 43 02/08/2024    ALKPHOS 65 02/08/2024    TP 7.6 02/08/2024    TBILI 0.45 02/08/2024    EGFR 94 02/08/2024     Lab Results   Component Value Date    PTT 31 02/08/2024     Lab Results   Component Value Date    INR 0.92 02/08/2024    PROTIME 12.6 02/08/2024       Physical Exam    Airway    Mallampati score: II  TM Distance: >3 FB  Neck ROM: full     Dental   No notable dental hx     Cardiovascular  Rhythm: regular, Rate: normal, Cardiovascular exam normal    Pulmonary  Pulmonary exam normal Breath sounds clear to auscultation    Other Findings        Anesthesia  Plan  ASA Score- 2     Anesthesia Type- spinal with ASA Monitors.         Additional Monitors:     Airway Plan:     Comment: Spinal with IV sedation.       Plan Factors-Exercise tolerance (METS): >4 METS.    Chart reviewed. EKG reviewed. Imaging results reviewed. Existing labs reviewed. Patient summary reviewed.    Patient is not a current smoker.  Patient did not smoke on day of surgery.    Obstructive sleep apnea risk education given perioperatively.        Induction- intravenous.    Postoperative Plan- Plan for postoperative opioid use.     Informed Consent- Anesthetic plan and risks discussed with patient.  I personally reviewed this patient with the CRNA. Discussed and agreed on the Anesthesia Plan with the CRNA..

## 2024-02-28 NOTE — ANESTHESIA POSTPROCEDURE EVALUATION
Post-Op Assessment Note    CV Status:  Stable  Pain Score: 0    Pain management: adequate       Mental Status:  Alert and awake   Hydration Status:  Euvolemic   PONV Controlled:  Controlled   Airway Patency:  Patent     Post Op Vitals Reviewed: Yes    No anethesia notable event occurred.    Staff: CRNA               BP   94/53   Temp 97.9   Pulse 70   Resp 16   SpO2 100

## 2024-02-28 NOTE — PLAN OF CARE
Problem: PHYSICAL THERAPY ADULT  Goal: Performs mobility at highest level of function for planned discharge setting.  See evaluation for individualized goals.  Description: Treatment/Interventions: Functional transfer training, LE strengthening/ROM, Elevations, Therapeutic exercise, Endurance training, Patient/family training, Gait training, Spoke to nursing, OT, Family  Equipment Recommended: Walker (Pt owns)       See flowsheet documentation for full assessment, interventions and recommendations.  Note: Prognosis: Good  Problem List: Decreased range of motion, Decreased strength, Decreased endurance, Impaired balance, Decreased mobility, Orthopedic restrictions, Pain  Assessment: Pt is a 58 y.o. male who [facility]24 s/p L CALEB, anterior approach done by Dr. Hannah. Precautions include WBAT on L LE. Pt has a past medical history of Adopted person, Arthritis, HL, Left hip pain. Pt greeted at bedside for PT evaluation on 02/28/24. Pt referred to PT for functional mobility evaluation & D/C planning w/ orders of activity beginning POD #0 and activity as tolerated. PTA, pt reports being I w/o AD and I w/ IADLs. Personal factors affecting pt at time of IE include: lives in 2 story house. Please find objective findings from PT assessment regarding body systems outlined above with impairments and limitations including weakness, decreased ROM, impaired balance, decreased endurance, gait deviations, pain, decreased activity tolerance, fall risk, and orthopedic restrictions.  Please see flow sheet above for objective findings and level of assistance required for safe completion of functional tasks. Pt was able to perform supine to sit with S and use of bedrails. Pt performed sit<>stand with S and RW. Vitals taken t/o evaluation, please see flowsheet for objective data. Pt was able to ambulate 70'x2 with S and RW. Pt performed 2 steps with S and use of RW (no rails) to simulate home set up. Pt needed  cues for proper LE sequencing during stair training, but had good carryover. Pt was left seated at bedside with wife, RN aware and updated. Reviewed car transfer with patient and wife, no questions at this time. Pt demonstrated dec endurance and tolerance to activity. Denies reports of dizziness or SOB t/o session. Patient was left supine in bed with call bell and all needs within reach. Pt was educated on fall precautions and reinforced w/ good understanding. Based on pt presentation and impaired function, pt would benefit from level III, (minimal resource intensity) at D/C. From PT/mobility standpoint, pt would benefit from OPPT to address deficits as defined above and maximize level of independence and return pt to PLOF. HEP given and reviewed with patient, no questions at this time. CM to follow. Nsg staff to continue to mobilized pt (OOB in chair for all meals & ambulate in room/unit) as tolerated to prevent further decline in function. Nsg notified. Co-eval performed with OT to complete this evaluation for the pts best interest given pts medical complexity and functional level.  Barriers to Discharge: None     Rehab Resource Intensity Level, PT: III (Minimum Resource Intensity)    See flowsheet documentation for full assessment.

## 2024-02-29 ENCOUNTER — TELEPHONE (OUTPATIENT)
Dept: OBGYN CLINIC | Facility: HOSPITAL | Age: 59
End: 2024-02-29

## 2024-02-29 NOTE — TELEPHONE ENCOUNTER
Patient contacted for a postoperative follow up assessment. Patient states current pain level of a  2/10  when sitting and 5/10 when walking with RW. Patient states 10/10 pain when moving leg from bed to floor. Patient states they have minimal pain and it is relieved with medication regimen. Patient denies increase in swelling and dressing is clean, dry and intact. Patient is icing the site regularly.     We reviewed patients AVS medication list. Patient is taking Tylenol 1000mg every 8 hours, Oxycodone 5mg PRN, ASA 81mg BID, Patient has not yet had a BM but is passing gas.      Patient denies nausea, vomiting, abdominal pain, chest pain, shortness of breath, fever, dizziness and calf pain. Patient confirmed post-op appointment with surgeon on  3/12 at 10:15AM. Patient does not have any other questions or concerns at this time. Pt was encouraged to call with any questions, concerns or issues.

## 2024-03-01 DIAGNOSIS — M16.12 PRIMARY OSTEOARTHRITIS OF ONE HIP, LEFT: ICD-10-CM

## 2024-03-01 NOTE — TELEPHONE ENCOUNTER
Caller: Patient    Doctor: [place]     Reason for call: Asked to speak with nurse about recent sx, call was warm transferred to nurse

## 2024-03-03 DIAGNOSIS — M16.12 PRIMARY OSTEOARTHRITIS OF ONE HIP, LEFT: ICD-10-CM

## 2024-03-03 RX ORDER — OXYCODONE HYDROCHLORIDE 5 MG/1
5 TABLET ORAL EVERY 4 HOURS PRN
Qty: 30 TABLET | Refills: 0 | Status: CANCELLED | OUTPATIENT
Start: 2024-03-03

## 2024-03-04 DIAGNOSIS — M16.12 PRIMARY OSTEOARTHRITIS OF ONE HIP, LEFT: Primary | ICD-10-CM

## 2024-03-04 RX ORDER — OXYCODONE HYDROCHLORIDE 5 MG/1
5 TABLET ORAL EVERY 4 HOURS PRN
Qty: 15 TABLET | Refills: 0 | Status: SHIPPED | OUTPATIENT
Start: 2024-03-04 | End: 2024-03-05 | Stop reason: SDUPTHER

## 2024-03-04 NOTE — TELEPHONE ENCOUNTER
Pt called to check refill status. Pt was advised of Tammy Hernandez MD message on Pt chart. While trying to look for surgeons information to route the request Pt put me on hold and call got disconnected.

## 2024-03-04 NOTE — TELEPHONE ENCOUNTER
Please advise that this was prescribed by surgeons, not me. He will need to contact them for refills.

## 2024-03-04 NOTE — TELEPHONE ENCOUNTER
Reason for call:   [x] Refill   [] Prior Auth  [] Other:     Office:   [] PCP/Provider -   [x] Specialty/Provider - ortho      Medication:   oxyCODONE (Roxicodone) 5 immediate release tablet   Dose/Frequency: Take 1 tablet (5 mg total) by mouth every 4 (four) hours as needed for severe pain for up to 30 doses Max Daily Amount: 30 mg     Quantity: #30    Pharmacy: [id number] - [place] - [address] [place] [address] [phone]

## 2024-03-05 DIAGNOSIS — M16.12 PRIMARY OSTEOARTHRITIS OF ONE HIP, LEFT: ICD-10-CM

## 2024-03-05 RX ORDER — OXYCODONE HYDROCHLORIDE 5 MG/1
5 TABLET ORAL EVERY 4 HOURS PRN
Qty: 30 TABLET | Refills: 0 | OUTPATIENT
Start: 2024-03-05

## 2024-03-05 RX ORDER — OXYCODONE HYDROCHLORIDE 5 MG/1
5 TABLET ORAL EVERY 4 HOURS PRN
Qty: 15 TABLET | Refills: 0 | Status: SHIPPED | OUTPATIENT
Start: 2024-03-05

## 2024-03-14 ENCOUNTER — OFFICE VISIT (OUTPATIENT)
Dept: OBGYN CLINIC | Facility: CLINIC | Age: 59
End: 2024-03-14

## 2024-03-14 VITALS
DIASTOLIC BLOOD PRESSURE: 89 MMHG | HEART RATE: 88 BPM | BODY MASS INDEX: 23.46 KG/M2 | SYSTOLIC BLOOD PRESSURE: 136 MMHG | WEIGHT: 146 LBS | HEIGHT: 66 IN

## 2024-03-14 DIAGNOSIS — Z96.642 STATUS POST TOTAL REPLACEMENT OF LEFT HIP: Primary | ICD-10-CM

## 2024-03-14 PROCEDURE — 99024 POSTOP FOLLOW-UP VISIT: CPT | Performed by: STUDENT IN AN ORGANIZED HEALTH CARE EDUCATION/TRAINING PROGRAM

## 2024-03-14 NOTE — LETTER
March 14, 2024     Patient: Andrei Morales  YOB: 1965  Date of Visit: 3/14/2024      To Whom it May Concern:    Andrei Morales is under my professional care. Andrei was seen in my office on 3/14/2024. Andrei does not require antibiotics prior to dental work.     If you have any questions or concerns, please don't hesitate to call.         Sincerely,          Justin Hannah MD        CC: No Recipients

## 2024-03-14 NOTE — PROGRESS NOTES
"      Date: 03/15/24  Andrei Morales   MRN# [id number]  : 1965      Chief Complaint: Post op Left  Total Hip Arthroplasty    Assessment and Plan:    Status post total replacement of left hip  Discussed post operative leg pain and swelling is normal  -WBAT  -Activity modification to limit strain or impact on the joint  -ASA 81mg for DVT PPx  -Tylenol as needed. Do not exceed 3000mg daily  -Begin supervised physical therapy. Script provided   -Home exercise program directed by PT  -Oxycodone for breakthrough pain for 1 more week  -Cane recommended to offload joint as needed  -Patient may wear thigh high compression stocking to aid in swelling  Patient does not require dental prophylaxis prior to dental work. I ask he wait until the end of April to be seen.   -Patient may follow up in 1 month(s) with radiographs for further evaluation and treatment          Subjective:   Patient is 2 weeks s/p left primary total hip arthroplasty.    Date of procedure: 24  Doing well, no new problems  Pain progressively improving  Improved swelling  Ambulating with no assistive device    Allergy:  Allergies   Allergen Reactions    Codeine Other (See Comments)     Unknown, reaction in childhood--\"cough syrup with codeine\"     Medications:  all current active meds have been reviewed  Past Medical History:  Past Medical History:   Diagnosis Date    Adopted person     Arthritis     Environmental allergies     \"outside allergies sometimes\"    Exercise involving walking     occas    HL (hearing loss)     Left hip pain     and leg    Tinnitus     Use of cane as ambulatory aid     sometimes    Wears glasses     \"magnifiers to read\"     Past Surgical History:  Past Surgical History:   Procedure Laterality Date    HAND SURGERY      DE ARTHRP ACETBLR/PROX FEM PROSTC AGRFT/ALGRFT Left 2024    Procedure: ARTHROPLASTY HIP TOTAL ANTERIOR, LEFT, SAMEDAY DISCHARGE;  Surgeon: Justin Hannah MD;  Location: [facility];  Service: " "Orthopedics    VASECTOMY       Family History:  Family History   Adopted: Yes   Problem Relation Age of Onset    No Known Problems Mother     No Known Problems Father      Social History:  Social History     Substance and Sexual Activity   Alcohol Use Yes    Alcohol/week: 2.0 standard drinks of alcohol    Types: 2 Standard drinks or equivalent per week    Comment: last etoh 2/26     Social History     Substance and Sexual Activity   Drug Use Never     Social History     Tobacco Use   Smoking Status Never   Smokeless Tobacco Never             Review of Systems:  General- denies fever/chills  HEENT- denies hearing loss or sore throat  Eyes- denies eye pain or visual disturbances, denies red eyes  Respiratory- denies cough or SOB  Cardio- denies chest pain or palpitations  GI- denies abdominal pain  Endocrine- denies urinary frequency  Urinary- denies pain with urination  Musculoskeletal- Negative except noted above  Skin- denies rashes or wounds  Neurological- denies dizziness or headache  Psychiatric- denies anxiety or difficulty concentrating    Objective:   BP Readings from Last 1 Encounters:   03/14/24 136/89      Wt Readings from Last 1 Encounters:   03/14/24 66.2 kg (146 lb)      Pulse Readings from Last 1 Encounters:   03/14/24 88        BMI: Estimated body mass index is 23.57 kg/m² as calculated from the following:    Height as of this encounter: 5' 6\" (1.676 m).    Weight as of this encounter: 66.2 kg (146 lb).    Physical Exam  /89   Pulse 88   Ht 5' 6\" (1.676 m)   Wt 66.2 kg (146 lb)   BMI 23.57 kg/m²   General/Constitutional: No apparent distress: well-nourished and well developed.  Eyes: normal ocular motion  Cardio: RRR, Normal S1S2, No m/r/g.   Lymphatic: No appreciable lymphadenopathy  Respiratory: Non-labored breathing, CTA b/l no w/c/r  Vascular: No edema, swelling or tenderness, except as noted in detailed exam. Extremities well perfused. No LE edema  Integumentary: No impressive skin " lesions present, except as noted in detailed exam.  Neuro: No ataxia or tremors noted  Psych: Normal mood and affect, oriented to person, place and time. Appropriate affect.  Musculoskeletal: Normal, except as noted in detailed exam and in HPI.    Gait and Station:   normal    left Hip Examination  Incision: clean, dry, and intact  Erythema: Absent  Ecchymosis: Absent  Wound edges: well approximated incision  Wound drainage: None: wound tissue dry  Hip ROM:    painless  Flexion: 90 degrees.   Motor: 5/5 EHL/FHL/TA/GS/QD/HS  Neurovascular exam is intact.   No evidence of calf tightness or posterior cords    Images:  No new imaging today    Scribe Attestation      I,:  Melida Way am acting as a scribe while in the presence of the attending physician.:       I,:  Justin Hannah MD personally performed the services described in this documentation    as scribed in my presence.:               Justin Hannah MD  Adult Reconstruction Specialist   [facility]

## 2024-03-15 PROBLEM — M16.12 PRIMARY OSTEOARTHRITIS OF ONE HIP, LEFT: Status: RESOLVED | Noted: 2024-02-27 | Resolved: 2024-03-15

## 2024-03-15 PROBLEM — Z96.642 STATUS POST TOTAL REPLACEMENT OF LEFT HIP: Status: ACTIVE | Noted: 2024-03-15

## 2024-03-16 NOTE — ASSESSMENT & PLAN NOTE
Discussed post operative leg pain and swelling is normal  -WBAT  -Activity modification to limit strain or impact on the joint  -ASA 81mg for DVT PPx  -Tylenol as needed. Do not exceed 3000mg daily  -Begin supervised physical therapy. Script provided   -Home exercise program directed by PT  -Oxycodone for breakthrough pain for 1 more week  -Cane recommended to offload joint as needed  -Patient may wear thigh high compression stocking to aid in swelling  Patient does not require dental prophylaxis prior to dental work. I ask he wait until the end of April to be seen.   -Patient may follow up in 1 month(s) with radiographs for further evaluation and treatment

## 2024-03-19 DIAGNOSIS — M25.552 LEFT HIP PAIN: Primary | ICD-10-CM

## 2024-03-20 RX ORDER — ACETAMINOPHEN 500 MG
500 TABLET ORAL EVERY 6 HOURS PRN
Qty: 120 TABLET | Refills: 0 | Status: SHIPPED | OUTPATIENT
Start: 2024-03-20

## 2024-03-20 NOTE — TELEPHONE ENCOUNTER
Patient requesting refill(s) of: tylenol 500 mg Q6H PRN    Last filled: historical provider  Last appt: 12/6/2023  Next appt: 12/10/2024  Pharmacy: [facility] [place]

## 2024-04-12 ENCOUNTER — OFFICE VISIT (OUTPATIENT)
Dept: OBGYN CLINIC | Facility: CLINIC | Age: 59
End: 2024-04-12

## 2024-04-12 ENCOUNTER — APPOINTMENT (OUTPATIENT)
Dept: RADIOLOGY | Age: 59
End: 2024-04-12
Payer: COMMERCIAL

## 2024-04-12 VITALS
DIASTOLIC BLOOD PRESSURE: 88 MMHG | SYSTOLIC BLOOD PRESSURE: 144 MMHG | HEART RATE: 73 BPM | HEIGHT: 66 IN | WEIGHT: 146 LBS | BODY MASS INDEX: 23.46 KG/M2

## 2024-04-12 DIAGNOSIS — Z96.642 STATUS POST TOTAL REPLACEMENT OF LEFT HIP: Primary | ICD-10-CM

## 2024-04-12 DIAGNOSIS — Z96.642 STATUS POST TOTAL REPLACEMENT OF LEFT HIP: ICD-10-CM

## 2024-04-12 PROCEDURE — 99024 POSTOP FOLLOW-UP VISIT: CPT | Performed by: STUDENT IN AN ORGANIZED HEALTH CARE EDUCATION/TRAINING PROGRAM

## 2024-04-12 PROCEDURE — 73502 X-RAY EXAM HIP UNI 2-3 VIEWS: CPT

## 2024-04-12 NOTE — ASSESSMENT & PLAN NOTE
Patient 6 weeks status post left total hip arthroplasty    -WBAT  -Activity modification to limit strain or impact on the joint  -May discontinue daily aspirin  -celecoxib (Celebrex) as needed  -Tylenol as needed. Do not exceed 3000mg daily  -Continue home exercise program  -Patient may follow up in 6 weeks for further evaluation and treatment

## 2024-04-12 NOTE — PROGRESS NOTES
"      Date: 24  Andrei Morales   MRN# [id number]  : 1965      Chief Complaint: Post op Left  Total Hip Arthroplasty    Assessment and Plan:    Status post total replacement of left hip  Patient 6 weeks status post left total hip arthroplasty    -WBAT  -Activity modification to limit strain or impact on the joint  -May discontinue daily aspirin  -celecoxib (Celebrex) as needed  -Tylenol as needed. Do not exceed 3000mg daily  -Continue home exercise program  -Patient may follow up in 6 weeks for further evaluation and treatment          Subjective:   Patient is 6 weeks s/p left primary total hip arthroplasty.    Date of procedure: 24  Doing well, no new problems  Pain progressively improving  Improved swelling  Ambulating with no assistive device    Allergy:  Allergies   Allergen Reactions    Codeine Other (See Comments)     Unknown, reaction in childhood--\"cough syrup with codeine\"     Medications:  all current active meds have been reviewed  Past Medical History:  Past Medical History:   Diagnosis Date    Adopted person     Arthritis     Environmental allergies     \"outside allergies sometimes\"    Exercise involving walking     occas    HL (hearing loss)     Left hip pain     and leg    Tinnitus     Use of cane as ambulatory aid     sometimes    Wears glasses     \"magnifiers to read\"     Past Surgical History:  Past Surgical History:   Procedure Laterality Date    HAND SURGERY      OR ARTHRP ACETBLR/PROX FEM PROSTC AGRFT/ALGRFT Left 2024    Procedure: ARTHROPLASTY HIP TOTAL ANTERIOR, LEFT, SAMEDAY DISCHARGE;  Surgeon: Justin Hannah MD;  Location:  MAIN OR;  Service: Orthopedics    VASECTOMY       Family History:  Family History   Adopted: Yes   Problem Relation Age of Onset    No Known Problems Mother     No Known Problems Father      Social History:  Social History     Substance and Sexual Activity   Alcohol Use Yes    Alcohol/week: 2.0 standard drinks of alcohol    Types: 2 Standard " "drinks or equivalent per week    Comment: last etoh 2/26     Social History     Substance and Sexual Activity   Drug Use Never     Social History     Tobacco Use   Smoking Status Never   Smokeless Tobacco Never             Review of Systems:  General- denies fever/chills  HEENT- denies hearing loss or sore throat  Eyes- denies eye pain or visual disturbances, denies red eyes  Respiratory- denies cough or SOB  Cardio- denies chest pain or palpitations  GI- denies abdominal pain  Endocrine- denies urinary frequency  Urinary- denies pain with urination  Musculoskeletal- Negative except noted above  Skin- denies rashes or wounds  Neurological- denies dizziness or headache  Psychiatric- denies anxiety or difficulty concentrating    Objective:   BP Readings from Last 1 Encounters:   04/12/24 144/88      Wt Readings from Last 1 Encounters:   04/12/24 66.2 kg (146 lb)      Pulse Readings from Last 1 Encounters:   04/12/24 73        BMI: Estimated body mass index is 23.57 kg/m² as calculated from the following:    Height as of this encounter: 5' 6\" (1.676 m).    Weight as of this encounter: 66.2 kg (146 lb).    Physical Exam  /88   Pulse 73   Ht 5' 6\" (1.676 m)   Wt 66.2 kg (146 lb)   BMI 23.57 kg/m²   General/Constitutional: No apparent distress: well-nourished and well developed.  Eyes: normal ocular motion  Cardio: RRR, Normal S1S2, No m/r/g.   Lymphatic: No appreciable lymphadenopathy  Respiratory: Non-labored breathing, CTA b/l no w/c/r  Vascular: No edema, swelling or tenderness, except as noted in detailed exam. Extremities well perfused. No LE edema  Integumentary: No impressive skin lesions present, except as noted in detailed exam.  Neuro: No ataxia or tremors noted  Psych: Normal mood and affect, oriented to person, place and time. Appropriate affect.  Musculoskeletal: Normal, except as noted in detailed exam and in HPI.    Gait and Station:   normal    left Hip Examination  Incision: " Well-healed  Erythema: Absent  Ecchymosis: Absent  Wound edges: well approximated incision  Wound drainage: None: wound tissue dry  Hip ROM:    painless  Flexion: 100 degrees.   External rotation: 30 degrees.   Internal rotation: 30  deg.   Abduction: 30 degrees.   Motor: 5/5 EHL/FHL/TA/GS/QD/HS  Neurovascular exam is intact.   No evidence of calf tightness or posterior cords    Images:    I personally reviewed relevant images in the PACS system and my interpretation is as follows:  X-rays of the left Hip reveal a total hip arthroplasty in appropriate alignment without evidence of dislocation, migration, wear or loosening.    Scribe Attestation      I,:   am acting as a scribe while in the presence of the attending physician.:       I,:   personally performed the services described in this documentation    as scribed in my presence.:               Justin Hannah MD  Adult Reconstruction Specialist   [facility]

## 2024-05-31 ENCOUNTER — OFFICE VISIT (OUTPATIENT)
Dept: OBGYN CLINIC | Facility: CLINIC | Age: 59
End: 2024-05-31
Payer: COMMERCIAL

## 2024-05-31 VITALS
SYSTOLIC BLOOD PRESSURE: 143 MMHG | HEIGHT: 66 IN | WEIGHT: 145 LBS | BODY MASS INDEX: 23.3 KG/M2 | HEART RATE: 82 BPM | DIASTOLIC BLOOD PRESSURE: 93 MMHG

## 2024-05-31 DIAGNOSIS — Z96.642 STATUS POST TOTAL REPLACEMENT OF LEFT HIP: Primary | ICD-10-CM

## 2024-05-31 PROCEDURE — 99213 OFFICE O/P EST LOW 20 MIN: CPT | Performed by: STUDENT IN AN ORGANIZED HEALTH CARE EDUCATION/TRAINING PROGRAM

## 2024-05-31 NOTE — ASSESSMENT & PLAN NOTE
Patient is 3 months s/p left total hip arthroplasty. He is doing well   - Tylenol and Celebrex for pain control prn  - Continue PT/HEP as directed  - No antibiotic dental prophylaxis is necessary  - No restrictions from our standpoint. Let pain be a guide  - RTC in 9 months. New left hip xray

## 2024-05-31 NOTE — PROGRESS NOTES
"      Date: 24  Andrei Morales   MRN# [id number]  : 1965      Chief Complaint: Post op Left  Total Hip Arthroplasty     Assessment and Plan:    Status post total replacement of left hip  Patient is 3 months s/p left total hip arthroplasty. He is doing well   - Tylenol and Celebrex for pain control prn  - Continue PT/HEP as directed  - No antibiotic dental prophylaxis is necessary  - No restrictions from our standpoint. Let pain be a guide  - RTC in 9 months. New left hip xray         Subjective:   Andrei Morales is a 58 y.o. male who is being seen in follow-up for above procedure. When we last saw he we recommended WBAT in sneaker.  Pain is well controlled and the patient has successfully transitioned to OTC pain medicines.    Date of procedure: 24  Doing well, no new problems  Pain progressively improving  Improved swelling  Ambulating with no assistive device    Allergy:  Allergies   Allergen Reactions    Codeine Other (See Comments)     Unknown, reaction in childhood--\"cough syrup with codeine\"     Medications:  all current active meds have been reviewed  Past Medical History:  Past Medical History:   Diagnosis Date    Adopted person     Arthritis     Environmental allergies     \"outside allergies sometimes\"    Exercise involving walking     occas    HL (hearing loss)     Left hip pain     and leg    Tinnitus     Use of cane as ambulatory aid     sometimes    Wears glasses     \"magnifiers to read\"     Past Surgical History:  Past Surgical History:   Procedure Laterality Date    HAND SURGERY      MD ARTHRP ACETBLR/PROX FEM PROSTC AGRFT/ALGRFT Left 2024    Procedure: ARTHROPLASTY HIP TOTAL ANTERIOR, LEFT, SAMEDAY DISCHARGE;  Surgeon: Justin Hannah MD;  Location: [facility];  Service: Orthopedics    VASECTOMY       Family History:  Family History   Adopted: Yes   Problem Relation Age of Onset    No Known Problems Mother     No Known Problems Father      Social History:  Social History " "    Substance and Sexual Activity   Alcohol Use Yes    Alcohol/week: 2.0 standard drinks of alcohol    Types: 2 Standard drinks or equivalent per week    Comment: last etoh 2/26     Social History     Substance and Sexual Activity   Drug Use Never     Social History     Tobacco Use   Smoking Status Never   Smokeless Tobacco Never             Review of Systems:  General- denies fever/chills  HEENT- denies hearing loss or sore throat  Eyes- denies eye pain or visual disturbances, denies red eyes  Respiratory- denies cough or SOB  Cardio- denies chest pain or palpitations  GI- denies abdominal pain  Endocrine- denies urinary frequency  Urinary- denies pain with urination  Musculoskeletal- Negative except noted above  Skin- denies rashes or wounds  Neurological- denies dizziness or headache  Psychiatric- denies anxiety or difficulty concentrating    Objective:   BP Readings from Last 1 Encounters:   05/31/24 143/93      Wt Readings from Last 1 Encounters:   05/31/24 65.8 kg (145 lb)      Pulse Readings from Last 1 Encounters:   05/31/24 82        BMI: Estimated body mass index is 23.4 kg/m² as calculated from the following:    Height as of this encounter: 5' 6\" (1.676 m).    Weight as of this encounter: 65.8 kg (145 lb).    Physical Exam  /93   Pulse 82   Ht 5' 6\" (1.676 m)   Wt 65.8 kg (145 lb)   BMI 23.40 kg/m²   General/Constitutional: No apparent distress: well-nourished and well developed.  Eyes: normal ocular motion  Cardio: RRR, Normal S1S2, No m/r/g.   Lymphatic: No appreciable lymphadenopathy  Respiratory: Non-labored breathing, CTA b/l no w/c/r  Vascular: No edema, swelling or tenderness, except as noted in detailed exam. Extremities well perfused. No LE edema  Integumentary: No impressive skin lesions present, except as noted in detailed exam.  Neuro: No ataxia or tremors noted  Psych: Normal mood and affect, oriented to person, place and time. Appropriate affect.  Musculoskeletal: Normal, except as " noted in detailed exam and in HPI.    Gait and Station:   normal    left Hip Examination  Incision: Well-healed  Erythema: Absent  Ecchymosis: Absent  Wound edges: well approximated incision  Wound drainage: None: wound tissue dry  Hip ROM:               painless  Flexion: 100 degrees.   External rotation: 30 degrees.   Internal rotation: 30  deg.   Abduction: 30 degrees.   Motor: 5/5 EHL/FHL/TA/GS/QD/HS  Neurovascular exam is intact.   No evidence of calf tightness or posterior cords    Images:  No new imaging    Scribe Attestation      I,:  Clint Sevilla PA-C am acting as a scribe while in the presence of the attending physician.:       I,:  Justin Hannah MD personally performed the services described in this documentation    as scribed in my presence.:               Justin Hannah MD  Adult Reconstruction Specialist   [facility]

## 2024-06-15 ENCOUNTER — HOSPITAL ENCOUNTER (OUTPATIENT)
Dept: ULTRASOUND IMAGING | Facility: HOSPITAL | Age: 59
Discharge: HOME | End: 2024-06-15
Payer: COMMERCIAL

## 2024-06-15 DIAGNOSIS — N43.40 SPERMATOCELE OF EPIDIDYMIS: ICD-10-CM

## 2024-06-15 PROCEDURE — 76870 US EXAM SCROTUM: CPT

## 2024-06-15 NOTE — LETTER
[facility] [address]  [address] [place] [address]      June 27, 2024    MRN: [id number]     Phone: [phone]     Dear  Andrew,    You recently had a(n) Ultrasound performed on 6/15/2024 at  [facility] that was requested by Tammy Hernandez MD. The study was reviewed by a radiologist, which is a physician who specializes in medical imaging. The radiologist issued a report describing his or her findings. In that report there was a finding that the radiologist felt warranted further discussion with your health care provider and that discussion would be beneficial to you.      The results were sent to Tammy Hernandez MD on 06/24/2024  8:50 AM. We recommend that you contact Tammy Hernandez MD at [phone] or set up an appointment to discuss the results of the imaging test. If you have already heard from Tammy Hernandez MD regarding the results of your study, you can disregard this letter.     This letter is not meant to alarm you, but intended to encourage you to follow-up on your results with the provider that sent you for the imaging study. In addition, we have enclosed answers to frequently asked questions by other patients who have also received a letter to review results with their health care provider (see page two).      Thank you for choosing [facility] for your medical imaging needs.                                                                                                                                                        FREQUENTLY ASKED QUESTIONS    Why am I receiving this letter?  [place] State Law requires us to notify patients who have findings on imaging exams that may require more testing or follow-up with a health professional within the next 3 months.        How serious is the finding on the imaging test?  This letter is sent to all patients who may need follow-up or more testing within the  next 3 months.  Receiving this letter does not necessarily mean you have a life-threatening imaging finding or disease.  Recommendations in the radiologist’s imaging report are general in nature and it is up to your healthcare provider to say whether those recommendations make sense for your situation.  You are strongly encouraged to talk to your health care provider about the results and ask whether additional steps need to be taken.    Where can I get a copy of the final report for my recent radiology exam?  To get a full copy of the report you can access your records online at [email or web address] or please contact [facility]’s Medical Records Department at [phone] Monday through Friday between 8 am and 6 pm.         What do I need to do now?           Please contact your health care provider who requested the imaging study to discuss what further actions (if any) are needed.  You may have already heard from (your ordering provider) in regard to this test in which case you can disregard this letter.        NOTICE IN ACCORDANCE WITH THE [place] STATE “PATIENT TEST RESULT INFORMATION ACT OF 2018”    You are receiving this notice as a result of a determination by your diagnostic imaging service that further discussions of your test results are warranted and would be beneficial to you.    The complete results of your test or tests have been or will be sent to the health care practitioner that ordered the test or tests. It is recommended that you contact your health care practitioner to discuss your results as soon as possible.

## 2024-06-24 ENCOUNTER — TELEPHONE (OUTPATIENT)
Dept: FAMILY MEDICINE CLINIC | Facility: CLINIC | Age: 59
End: 2024-06-24

## 2024-06-24 DIAGNOSIS — I86.1 VARICOCELE: ICD-10-CM

## 2024-06-24 DIAGNOSIS — N43.40 SPERMATOCELE OF EPIDIDYMIS: Primary | ICD-10-CM

## 2024-06-24 DIAGNOSIS — R93.89 ABNORMAL ULTRASOUND: ICD-10-CM

## 2024-06-24 NOTE — RESULT ENCOUNTER NOTE
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 Order placed. Alternatively, he could see Urology first to discuss further.

## 2024-06-24 NOTE — TELEPHONE ENCOUNTER
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 Order placed. Alternatively, he could see Urology first to discuss further.

## 2024-06-25 NOTE — TELEPHONE ENCOUNTER
Patient returned call, notified him of Dr. Calzada message and notified him of order placed for CT of abdomen. Patient will call central scheduling for an appt.

## 2024-07-03 ENCOUNTER — PATIENT MESSAGE (OUTPATIENT)
Dept: FAMILY MEDICINE CLINIC | Facility: CLINIC | Age: 59
End: 2024-07-03

## 2024-07-08 ENCOUNTER — PATIENT MESSAGE (OUTPATIENT)
Dept: FAMILY MEDICINE CLINIC | Facility: CLINIC | Age: 59
End: 2024-07-08

## 2024-07-08 DIAGNOSIS — N43.40 SPERMATOCELE OF EPIDIDYMIS: ICD-10-CM

## 2024-07-08 DIAGNOSIS — I86.1 VARICOCELE: Primary | ICD-10-CM

## 2024-07-21 ENCOUNTER — APPOINTMENT (EMERGENCY)
Dept: RADIOLOGY | Facility: HOSPITAL | Age: 59
DRG: 896 | End: 2024-07-21
Payer: COMMERCIAL

## 2024-07-21 ENCOUNTER — APPOINTMENT (EMERGENCY)
Dept: CT IMAGING | Facility: HOSPITAL | Age: 59
DRG: 896 | End: 2024-07-21
Payer: COMMERCIAL

## 2024-07-21 ENCOUNTER — HOSPITAL ENCOUNTER (INPATIENT)
Facility: HOSPITAL | Age: 59
LOS: 1 days | Discharge: HOME | DRG: 896 | End: 2024-07-21
Attending: EMERGENCY MEDICINE | Admitting: STUDENT IN AN ORGANIZED HEALTH CARE EDUCATION/TRAINING PROGRAM
Payer: COMMERCIAL

## 2024-07-21 VITALS
BODY MASS INDEX: 24.23 KG/M2 | SYSTOLIC BLOOD PRESSURE: 137 MMHG | DIASTOLIC BLOOD PRESSURE: 87 MMHG | WEIGHT: 150.13 LBS | RESPIRATION RATE: 27 BRPM | OXYGEN SATURATION: 98 % | TEMPERATURE: 96.9 F | HEART RATE: 111 BPM

## 2024-07-21 DIAGNOSIS — T17.908A ASPIRATION INTO AIRWAY, INITIAL ENCOUNTER: ICD-10-CM
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
## 2024-07-21 DIAGNOSIS — F10.929 ALCOHOL INTOXICATION (HCC): Primary | ICD-10-CM

## 2024-07-21 PROBLEM — J96.00 ACUTE RESPIRATORY FAILURE (HCC): Status: ACTIVE | Noted: 2024-07-21

## 2024-07-21 PROBLEM — F10.10 ALCOHOL ABUSE: Status: ACTIVE | Noted: 2024-07-21

## 2024-07-21 LAB
ABO GROUP BLD: NORMAL
ALBUMIN SERPL BCG-MCNC: 3.8 G/DL (ref 3.5–5)
ALBUMIN SERPL BCG-MCNC: 4.5 G/DL (ref 3.5–5)
ALP SERPL-CCNC: 45 U/L (ref 34–104)
ALP SERPL-CCNC: 59 U/L (ref 34–104)
ALT SERPL W P-5'-P-CCNC: 40 U/L (ref 7–52)
ALT SERPL W P-5'-P-CCNC: 49 U/L (ref 7–52)
AMPHETAMINES SERPL QL SCN: NEGATIVE
ANION GAP SERPL CALCULATED.3IONS-SCNC: 12 MMOL/L (ref 4–13)
ANION GAP SERPL CALCULATED.3IONS-SCNC: 15 MMOL/L (ref 4–13)
AST SERPL W P-5'-P-CCNC: 21 U/L (ref 13–39)
AST SERPL W P-5'-P-CCNC: 25 U/L (ref 13–39)
ATRIAL RATE: 115 BPM
BACTERIA UR QL AUTO: ABNORMAL /HPF
BARBITURATES UR QL: NEGATIVE
BASE EX.OXY STD BLDV CALC-SCNC: 91.4 % (ref 60–80)
BASE EXCESS BLDV CALC-SCNC: -4.7 MMOL/L
BASOPHILS # BLD AUTO: 0.05 THOUSANDS/ÂΜL (ref 0–0.1)
BASOPHILS # BLD AUTO: 0.08 THOUSANDS/ÂΜL (ref 0–0.1)
BASOPHILS NFR BLD AUTO: 1 % (ref 0–1)
BASOPHILS NFR BLD AUTO: 1 % (ref 0–1)
BENZODIAZ UR QL: NEGATIVE
BILIRUB SERPL-MCNC: 0.34 MG/DL (ref 0.2–1)
BILIRUB SERPL-MCNC: 0.65 MG/DL (ref 0.2–1)
BILIRUB UR QL STRIP: NEGATIVE
BLD GP AB SCN SERPL QL: NEGATIVE
BUN SERPL-MCNC: 20 MG/DL (ref 5–25)
BUN SERPL-MCNC: 24 MG/DL (ref 5–25)
CA-I BLD-SCNC: 1.01 MMOL/L (ref 1.12–1.32)
CALCIUM SERPL-MCNC: 8 MG/DL (ref 8.4–10.2)
CALCIUM SERPL-MCNC: 9.3 MG/DL (ref 8.4–10.2)
CHLORIDE SERPL-SCNC: 105 MMOL/L (ref 96–108)
CHLORIDE SERPL-SCNC: 107 MMOL/L (ref 96–108)
CLARITY UR: CLEAR
CO2 SERPL-SCNC: 21 MMOL/L (ref 21–32)
CO2 SERPL-SCNC: 22 MMOL/L (ref 21–32)
COCAINE UR QL: NEGATIVE
COLOR UR: YELLOW
CREAT SERPL-MCNC: 0.83 MG/DL (ref 0.6–1.3)
CREAT SERPL-MCNC: 0.95 MG/DL (ref 0.6–1.3)
EOSINOPHIL # BLD AUTO: 0.02 THOUSAND/ÂΜL (ref 0–0.61)
EOSINOPHIL # BLD AUTO: 0.06 THOUSAND/ÂΜL (ref 0–0.61)
EOSINOPHIL NFR BLD AUTO: 0 % (ref 0–6)
EOSINOPHIL NFR BLD AUTO: 1 % (ref 0–6)
ERYTHROCYTE [DISTWIDTH] IN BLOOD BY AUTOMATED COUNT: 13.4 % (ref 11.6–15.1)
ERYTHROCYTE [DISTWIDTH] IN BLOOD BY AUTOMATED COUNT: 13.7 % (ref 11.6–15.1)
ETHANOL SERPL-MCNC: 301 MG/DL
FENTANYL UR QL SCN: NEGATIVE
GFR SERPL CREATININE-BSD FRML MDRD: 87 ML/MIN/1.73SQ M
GFR SERPL CREATININE-BSD FRML MDRD: 96 ML/MIN/1.73SQ M
GLUCOSE SERPL-MCNC: 109 MG/DL (ref 65–140)
GLUCOSE SERPL-MCNC: 151 MG/DL (ref 65–140)
GLUCOSE UR STRIP-MCNC: NEGATIVE MG/DL
HCO3 BLDV-SCNC: 20.7 MMOL/L (ref 24–30)
HCT VFR BLD AUTO: 38.9 % (ref 36.5–49.3)
HCT VFR BLD AUTO: 44.7 % (ref 36.5–49.3)
HGB BLD-MCNC: 12.9 G/DL (ref 12–17)
HGB BLD-MCNC: 14.6 G/DL (ref 12–17)
HGB UR QL STRIP.AUTO: ABNORMAL
HYDROCODONE UR QL SCN: NEGATIVE
IMM GRANULOCYTES # BLD AUTO: 0.04 THOUSAND/UL (ref 0–0.2)
IMM GRANULOCYTES # BLD AUTO: 0.05 THOUSAND/UL (ref 0–0.2)
IMM GRANULOCYTES NFR BLD AUTO: 1 % (ref 0–2)
IMM GRANULOCYTES NFR BLD AUTO: 1 % (ref 0–2)
KETONES UR STRIP-MCNC: ABNORMAL MG/DL
LEUKOCYTE ESTERASE UR QL STRIP: NEGATIVE
LYMPHOCYTES # BLD AUTO: 1.56 THOUSANDS/ÂΜL (ref 0.6–4.47)
LYMPHOCYTES # BLD AUTO: 2.54 THOUSANDS/ÂΜL (ref 0.6–4.47)
LYMPHOCYTES NFR BLD AUTO: 15 % (ref 14–44)
LYMPHOCYTES NFR BLD AUTO: 29 % (ref 14–44)
MAGNESIUM SERPL-MCNC: 2 MG/DL (ref 1.9–2.7)
MCH RBC QN AUTO: 31 PG (ref 26.8–34.3)
MCH RBC QN AUTO: 31.6 PG (ref 26.8–34.3)
MCHC RBC AUTO-ENTMCNC: 32.7 G/DL (ref 31.4–37.4)
MCHC RBC AUTO-ENTMCNC: 33.2 G/DL (ref 31.4–37.4)
MCV RBC AUTO: 95 FL (ref 82–98)
MCV RBC AUTO: 95 FL (ref 82–98)
METHADONE UR QL: NEGATIVE
MONOCYTES # BLD AUTO: 0.76 THOUSAND/ÂΜL (ref 0.17–1.22)
MONOCYTES # BLD AUTO: 0.79 THOUSAND/ÂΜL (ref 0.17–1.22)
MONOCYTES NFR BLD AUTO: 7 % (ref 4–12)
MONOCYTES NFR BLD AUTO: 9 % (ref 4–12)
MUCOUS THREADS UR QL AUTO: ABNORMAL
NEUTROPHILS # BLD AUTO: 5.35 THOUSANDS/ÂΜL (ref 1.85–7.62)
NEUTROPHILS # BLD AUTO: 8.22 THOUSANDS/ÂΜL (ref 1.85–7.62)
NEUTS SEG NFR BLD AUTO: 59 % (ref 43–75)
NEUTS SEG NFR BLD AUTO: 76 % (ref 43–75)
NITRITE UR QL STRIP: NEGATIVE
NON-SQ EPI CELLS URNS QL MICRO: ABNORMAL /HPF
NRBC BLD AUTO-RTO: 0 /100 WBCS
NRBC BLD AUTO-RTO: 0 /100 WBCS
O2 CT BLDV-SCNC: 17.3 ML/DL
OPIATES UR QL SCN: NEGATIVE
OXYCODONE+OXYMORPHONE UR QL SCN: NEGATIVE
P AXIS: 61 DEGREES
PCO2 BLDV: 39.6 MM HG (ref 42–50)
PCP UR QL: NEGATIVE
PH BLDV: 7.34 [PH] (ref 7.3–7.4)
PH UR STRIP.AUTO: 6 [PH]
PHOSPHATE SERPL-MCNC: 3.8 MG/DL (ref 2.7–4.5)
PLATELET # BLD AUTO: 272 THOUSANDS/UL (ref 149–390)
PLATELET # BLD AUTO: 305 THOUSANDS/UL (ref 149–390)
PMV BLD AUTO: 9.3 FL (ref 8.9–12.7)
PMV BLD AUTO: 9.3 FL (ref 8.9–12.7)
PO2 BLDV: 74.8 MM HG (ref 35–45)
POTASSIUM SERPL-SCNC: 3.4 MMOL/L (ref 3.5–5.3)
POTASSIUM SERPL-SCNC: 3.9 MMOL/L (ref 3.5–5.3)
PR INTERVAL: 172 MS
PROCALCITONIN SERPL-MCNC: <0.05 NG/ML
PROT SERPL-MCNC: 5.8 G/DL (ref 6.4–8.4)
PROT SERPL-MCNC: 7.4 G/DL (ref 6.4–8.4)
PROT UR STRIP-MCNC: NEGATIVE MG/DL
QRS AXIS: 58 DEGREES
QRSD INTERVAL: 86 MS
QT INTERVAL: 326 MS
QTC INTERVAL: 450 MS
RBC # BLD AUTO: 4.08 MILLION/UL (ref 3.88–5.62)
RBC # BLD AUTO: 4.71 MILLION/UL (ref 3.88–5.62)
RBC #/AREA URNS AUTO: ABNORMAL /HPF
RH BLD: POSITIVE
SODIUM SERPL-SCNC: 141 MMOL/L (ref 135–147)
SODIUM SERPL-SCNC: 141 MMOL/L (ref 135–147)
SP GR UR STRIP.AUTO: 1.02
SPECIMEN EXPIRATION DATE: NORMAL
T WAVE AXIS: 0 DEGREES
THC UR QL: NEGATIVE
UROBILINOGEN UR QL STRIP.AUTO: 0.2 E.U./DL
VENTRICULAR RATE: 115 BPM
WBC # BLD AUTO: 10.66 THOUSAND/UL (ref 4.31–10.16)
WBC # BLD AUTO: 8.86 THOUSAND/UL (ref 4.31–10.16)
WBC #/AREA URNS AUTO: ABNORMAL /HPF

## 2024-07-21 PROCEDURE — 83735 ASSAY OF MAGNESIUM: CPT

## 2024-07-21 PROCEDURE — 99285 EMERGENCY DEPT VISIT HI MDM: CPT

## 2024-07-21 PROCEDURE — 31500 INSERT EMERGENCY AIRWAY: CPT | Performed by: EMERGENCY MEDICINE

## 2024-07-21 PROCEDURE — 36415 COLL VENOUS BLD VENIPUNCTURE: CPT | Performed by: EMERGENCY MEDICINE

## 2024-07-21 PROCEDURE — 84100 ASSAY OF PHOSPHORUS: CPT

## 2024-07-21 PROCEDURE — 71260 CT THORAX DX C+: CPT

## 2024-07-21 PROCEDURE — 5A1935Z RESPIRATORY VENTILATION, LESS THAN 24 CONSECUTIVE HOURS: ICD-10-PCS | Performed by: EMERGENCY MEDICINE

## 2024-07-21 PROCEDURE — 86900 BLOOD TYPING SEROLOGIC ABO: CPT | Performed by: EMERGENCY MEDICINE

## 2024-07-21 PROCEDURE — 85025 COMPLETE CBC W/AUTO DIFF WBC: CPT | Performed by: EMERGENCY MEDICINE

## 2024-07-21 PROCEDURE — 70450 CT HEAD/BRAIN W/O DYE: CPT

## 2024-07-21 PROCEDURE — 99291 CRITICAL CARE FIRST HOUR: CPT | Performed by: EMERGENCY MEDICINE

## 2024-07-21 PROCEDURE — 82077 ASSAY SPEC XCP UR&BREATH IA: CPT | Performed by: EMERGENCY MEDICINE

## 2024-07-21 PROCEDURE — 71045 X-RAY EXAM CHEST 1 VIEW: CPT

## 2024-07-21 PROCEDURE — 74177 CT ABD & PELVIS W/CONTRAST: CPT

## 2024-07-21 PROCEDURE — 85025 COMPLETE CBC W/AUTO DIFF WBC: CPT

## 2024-07-21 PROCEDURE — 0BH17EZ INSERTION OF ENDOTRACHEAL AIRWAY INTO TRACHEA, VIA NATURAL OR ARTIFICIAL OPENING: ICD-10-PCS | Performed by: EMERGENCY MEDICINE

## 2024-07-21 PROCEDURE — 86850 RBC ANTIBODY SCREEN: CPT | Performed by: EMERGENCY MEDICINE

## 2024-07-21 PROCEDURE — 86901 BLOOD TYPING SEROLOGIC RH(D): CPT | Performed by: EMERGENCY MEDICINE

## 2024-07-21 PROCEDURE — 82330 ASSAY OF CALCIUM: CPT

## 2024-07-21 PROCEDURE — 94760 N-INVAS EAR/PLS OXIMETRY 1: CPT

## 2024-07-21 PROCEDURE — 80307 DRUG TEST PRSMV CHEM ANLYZR: CPT | Performed by: EMERGENCY MEDICINE

## 2024-07-21 PROCEDURE — 72125 CT NECK SPINE W/O DYE: CPT

## 2024-07-21 PROCEDURE — 72170 X-RAY EXAM OF PELVIS: CPT

## 2024-07-21 PROCEDURE — 94664 DEMO&/EVAL PT USE INHALER: CPT

## 2024-07-21 PROCEDURE — 81001 URINALYSIS AUTO W/SCOPE: CPT | Performed by: EMERGENCY MEDICINE

## 2024-07-21 PROCEDURE — 99291 CRITICAL CARE FIRST HOUR: CPT

## 2024-07-21 PROCEDURE — 93005 ELECTROCARDIOGRAM TRACING: CPT

## 2024-07-21 PROCEDURE — 94002 VENT MGMT INPAT INIT DAY: CPT

## 2024-07-21 PROCEDURE — 96360 HYDRATION IV INFUSION INIT: CPT

## 2024-07-21 PROCEDURE — 80053 COMPREHEN METABOLIC PANEL: CPT

## 2024-07-21 PROCEDURE — 93010 ELECTROCARDIOGRAM REPORT: CPT | Performed by: INTERNAL MEDICINE

## 2024-07-21 PROCEDURE — NC001 PR NO CHARGE: Performed by: NURSE PRACTITIONER

## 2024-07-21 PROCEDURE — 82805 BLOOD GASES W/O2 SATURATION: CPT

## 2024-07-21 PROCEDURE — 84145 PROCALCITONIN (PCT): CPT

## 2024-07-21 PROCEDURE — 80053 COMPREHEN METABOLIC PANEL: CPT | Performed by: EMERGENCY MEDICINE

## 2024-07-21 RX ORDER — SODIUM CHLORIDE, SODIUM GLUCONATE, SODIUM ACETATE, POTASSIUM CHLORIDE, MAGNESIUM CHLORIDE, SODIUM PHOSPHATE, DIBASIC, AND POTASSIUM PHOSPHATE .53; .5; .37; .037; .03; .012; .00082 G/100ML; G/100ML; G/100ML; G/100ML; G/100ML; G/100ML; G/100ML
1000 INJECTION, SOLUTION INTRAVENOUS ONCE
Status: COMPLETED | OUTPATIENT
Start: 2024-07-21 | End: 2024-07-21

## 2024-07-21 RX ORDER — CHLORHEXIDINE GLUCONATE ORAL RINSE 1.2 MG/ML
15 SOLUTION DENTAL EVERY 12 HOURS SCHEDULED
Status: DISCONTINUED | OUTPATIENT
Start: 2024-07-21 | End: 2024-07-21 | Stop reason: HOSPADM

## 2024-07-21 RX ORDER — MAGNESIUM HYDROXIDE/ALUMINUM HYDROXICE/SIMETHICONE 120; 1200; 1200 MG/30ML; MG/30ML; MG/30ML
30 SUSPENSION ORAL EVERY 4 HOURS PRN
Status: DISCONTINUED | OUTPATIENT
Start: 2024-07-21 | End: 2024-07-21 | Stop reason: HOSPADM

## 2024-07-21 RX ORDER — VECURONIUM BROMIDE 1 MG/ML
10 INJECTION, POWDER, LYOPHILIZED, FOR SOLUTION INTRAVENOUS ONCE
Status: COMPLETED | OUTPATIENT
Start: 2024-07-21 | End: 2024-07-21

## 2024-07-21 RX ORDER — PANTOPRAZOLE SODIUM 40 MG/1
40 TABLET, DELAYED RELEASE ORAL
Status: DISCONTINUED | OUTPATIENT
Start: 2024-07-21 | End: 2024-07-21 | Stop reason: HOSPADM

## 2024-07-21 RX ORDER — PROPOFOL 10 MG/ML
5-50 INJECTION, EMULSION INTRAVENOUS
Status: DISCONTINUED | OUTPATIENT
Start: 2024-07-21 | End: 2024-07-21

## 2024-07-21 RX ORDER — ETOMIDATE 2 MG/ML
20 INJECTION INTRAVENOUS ONCE
Status: COMPLETED | OUTPATIENT
Start: 2024-07-21 | End: 2024-07-21

## 2024-07-21 RX ORDER — SODIUM CHLORIDE, SODIUM GLUCONATE, SODIUM ACETATE, POTASSIUM CHLORIDE, MAGNESIUM CHLORIDE, SODIUM PHOSPHATE, DIBASIC, AND POTASSIUM PHOSPHATE .53; .5; .37; .037; .03; .012; .00082 G/100ML; G/100ML; G/100ML; G/100ML; G/100ML; G/100ML; G/100ML
125 INJECTION, SOLUTION INTRAVENOUS CONTINUOUS
Status: DISCONTINUED | OUTPATIENT
Start: 2024-07-21 | End: 2024-07-21

## 2024-07-21 RX ORDER — KETAMINE HCL IN NACL, ISO-OSM 100MG/10ML
0.5 SYRINGE (ML) INJECTION ONCE
Status: COMPLETED | OUTPATIENT
Start: 2024-07-21 | End: 2024-07-21

## 2024-07-21 RX ORDER — FENTANYL CITRATE 50 UG/ML
50 INJECTION, SOLUTION INTRAMUSCULAR; INTRAVENOUS EVERY 2 HOUR PRN
Status: DISCONTINUED | OUTPATIENT
Start: 2024-07-21 | End: 2024-07-21

## 2024-07-21 RX ORDER — CALCIUM GLUCONATE 20 MG/ML
1 INJECTION, SOLUTION INTRAVENOUS ONCE
Status: COMPLETED | OUTPATIENT
Start: 2024-07-21 | End: 2024-07-21

## 2024-07-21 RX ORDER — KETAMINE HYDROCHLORIDE 50 MG/ML
4 INJECTION, SOLUTION INTRAMUSCULAR; INTRAVENOUS ONCE
Status: DISCONTINUED | OUTPATIENT
Start: 2024-07-21 | End: 2024-07-21

## 2024-07-21 RX ORDER — FENTANYL CITRATE-0.9 % NACL/PF 10 MCG/ML
25 PLASTIC BAG, INJECTION (ML) INTRAVENOUS CONTINUOUS
Status: DISCONTINUED | OUTPATIENT
Start: 2024-07-21 | End: 2024-07-21

## 2024-07-21 RX ORDER — HEPARIN SODIUM 5000 [USP'U]/ML
5000 INJECTION, SOLUTION INTRAVENOUS; SUBCUTANEOUS EVERY 8 HOURS SCHEDULED
Status: DISCONTINUED | OUTPATIENT
Start: 2024-07-21 | End: 2024-07-21 | Stop reason: HOSPADM

## 2024-07-21 RX ORDER — CHLORHEXIDINE GLUCONATE ORAL RINSE 1.2 MG/ML
15 SOLUTION DENTAL EVERY 12 HOURS SCHEDULED
Status: DISCONTINUED | OUTPATIENT
Start: 2024-07-21 | End: 2024-07-21

## 2024-07-21 RX ORDER — CALCIUM GLUCONATE 20 MG/ML
2 INJECTION, SOLUTION INTRAVENOUS ONCE
Status: COMPLETED | OUTPATIENT
Start: 2024-07-21 | End: 2024-07-21

## 2024-07-21 RX ADMIN — SODIUM CHLORIDE, SODIUM GLUCONATE, SODIUM ACETATE, POTASSIUM CHLORIDE, MAGNESIUM CHLORIDE, SODIUM PHOSPHATE, DIBASIC, AND POTASSIUM PHOSPHATE 125 ML/HR: .53; .5; .37; .037; .03; .012; .00082 INJECTION, SOLUTION INTRAVENOUS at 06:03

## 2024-07-21 RX ADMIN — VECURONIUM BROMIDE 10 MG: 1 INJECTION, POWDER, LYOPHILIZED, FOR SOLUTION INTRAVENOUS at 00:58

## 2024-07-21 RX ADMIN — HEPARIN SODIUM 5000 UNITS: 5000 INJECTION, SOLUTION INTRAVENOUS; SUBCUTANEOUS at 06:20

## 2024-07-21 RX ADMIN — CALCIUM GLUCONATE 2 G: 20 INJECTION, SOLUTION INTRAVENOUS at 06:00

## 2024-07-21 RX ADMIN — SODIUM CHLORIDE, SODIUM GLUCONATE, SODIUM ACETATE, POTASSIUM CHLORIDE, MAGNESIUM CHLORIDE, SODIUM PHOSPHATE, DIBASIC, AND POTASSIUM PHOSPHATE 1000 ML: .53; .5; .37; .037; .03; .012; .00082 INJECTION, SOLUTION INTRAVENOUS at 03:47

## 2024-07-21 RX ADMIN — SODIUM CHLORIDE 500 ML: 0.9 INJECTION, SOLUTION INTRAVENOUS at 02:09

## 2024-07-21 RX ADMIN — Medication 50 MCG/HR: at 03:44

## 2024-07-21 RX ADMIN — IOHEXOL 100 ML: 350 INJECTION, SOLUTION INTRAVENOUS at 01:33

## 2024-07-21 RX ADMIN — ALUMINUM HYDROXIDE, MAGNESIUM HYDROXIDE, DIMETHICONE 30 ML: 400; 400; 40 SUSPENSION ORAL at 08:54

## 2024-07-21 RX ADMIN — FENTANYL CITRATE 50 MCG: 50 INJECTION INTRAMUSCULAR; INTRAVENOUS at 03:44

## 2024-07-21 RX ADMIN — FENTANYL CITRATE 50 MCG: 50 INJECTION INTRAMUSCULAR; INTRAVENOUS at 05:51

## 2024-07-21 RX ADMIN — CALCIUM GLUCONATE 1 G: 20 INJECTION, SOLUTION INTRAVENOUS at 06:40

## 2024-07-21 RX ADMIN — PANTOPRAZOLE SODIUM 40 MG: 40 TABLET, DELAYED RELEASE ORAL at 06:43

## 2024-07-21 RX ADMIN — PROPOFOL 50 MCG/KG/MIN: 10 INJECTION, EMULSION INTRAVENOUS at 06:02

## 2024-07-21 RX ADMIN — THIAMINE HYDROCHLORIDE: 100 INJECTION, SOLUTION INTRAMUSCULAR; INTRAVENOUS at 10:44

## 2024-07-21 RX ADMIN — ETOMIDATE 20 MG: 20 INJECTION, SOLUTION INTRAVENOUS at 00:57

## 2024-07-21 RX ADMIN — Medication 35 MG: at 03:29

## 2024-07-21 RX ADMIN — PROPOFOL 10 MCG/KG/MIN: 10 INJECTION, EMULSION INTRAVENOUS at 01:20

## 2024-07-21 RX ADMIN — SODIUM CHLORIDE, SODIUM GLUCONATE, SODIUM ACETATE, POTASSIUM CHLORIDE, MAGNESIUM CHLORIDE, SODIUM PHOSPHATE, DIBASIC, AND POTASSIUM PHOSPHATE 1000 ML: .53; .5; .37; .037; .03; .012; .00082 INJECTION, SOLUTION INTRAVENOUS at 05:02

## 2024-07-21 NOTE — QUICK NOTE
Attempted to contact family listed in the chart.   Son Andrew willams went to  and then disconnected  Wife Christie Hayward LM on VM

## 2024-07-21 NOTE — RESPIRATORY THERAPY NOTE
"RT Protocol Note  Andrei Morales 58 y.o. male MRN: [id number]  Unit/Bed#: ICU 04-01 Encounter: [id number]    Assessment    Principal Problem:    Alcohol intoxication (HCC)  Active Problems:    Osteoarthritis of metacarpophalangeal (MCP) joint    Status post total replacement of left hip    Acute respiratory failure (HCC)      Home Pulmonary Medications: none    Home Devices/Therapy:  (none)    Past Medical History:   Diagnosis Date    Adopted person     Arthritis     Environmental allergies     \"outside allergies sometimes\"    Exercise involving walking     occas    HL (hearing loss)     Left hip pain     and leg    Tinnitus     Use of cane as ambulatory aid     sometimes    Wears glasses     \"magnifiers to read\"     Social History     Socioeconomic History    Marital status: /Civil Union     Spouse name: None    Number of children: 1    Years of education: None    Highest education level: None   Occupational History    None   Tobacco Use    Smoking status: Never    Smokeless tobacco: Never   Vaping Use    Vaping status: Never Used   Substance and Sexual Activity    Alcohol use: Yes     Alcohol/week: 2.0 standard drinks of alcohol     Types: 2 Standard drinks or equivalent per week     Comment: last etoh 2/26    Drug use: Never    Sexual activity: None     Comment: defer   Other Topics Concern    None   Social History Narrative    Daily caffeine use- 1 cup of coffee     Social Determinants of Health     Financial Resource Strain: Not on file   Food Insecurity: Not on file   Transportation Needs: Not on file   Physical Activity: Not on file   Stress: Not on file   Social Connections: Not on file   Intimate Partner Violence: Not on file   Housing Stability: Not on file       Subjective         Objective    Physical Exam:   Assessment Type: Assess only  General Appearance: Sedated  Respiratory Pattern: Assisted  Chest Assessment: Chest expansion symmetrical  Bilateral Breath Sounds: Coarse  Suction: Oral  O2 " Device: vent    Vitals:  Blood pressure (!) 183/130, pulse 101, temperature (!) 96.9 °F (36.1 °C), temperature source Temporal, resp. rate 14, weight 68.1 kg (150 lb 2.1 oz), SpO2 100%.          Imaging and other studies: I have personally reviewed pertinent reports.     07/21/24 8587   Respiratory Protocol   Protocol Initiated? Yes   Protocol Selection Respiratory   Language Barrier? Yes  (pt intubated)   Medical & Social History Reviewed? Yes   Diagnostic Studies Reviewed? Yes   Physical Assessment Performed? Yes   Home Devices/Therapy   (none)   Respiratory Plan Vent/NIV/HFNC   Respiratory Assessment   Assessment Type Assess only   General Appearance Sedated   Respiratory Pattern Assisted   Chest Assessment Chest expansion symmetrical   Bilateral Breath Sounds Coarse   Resp Comments pt placed on respiratory protocol due to pt intubated   O2 Device vent   Additional Assessments   Pulse 100   Respirations 14   SpO2 99 %         O2 Device: vent     Plan    Respiratory Plan: Vent/NIV/HFNC        Resp Comments: pt placed on respiratory protocol due to pt intubated
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
## 2024-07-21 NOTE — RESPIRATORY THERAPY NOTE
07/21/24 5423   Respiratory Assessment   Resp Comments Pt extubated to 2lnc, pt agitated, voice is strong, no stridor heard, Sat is stable.   Oxygen Therapy/Pulse Ox   O2 Device Nasal cannula   Nasal Cannula O2 Flow Rate (L/min) 2 L/min   Calculated FIO2 (%) - Nasal Cannula 28   SpO2 99 %   $ Pulse Oximetry Spot Check Charge Completed
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
## 2024-07-21 NOTE — RESPIRATORY THERAPY NOTE
RT Ventilator Management Note  Andrei Morales 58 y.o. male MRN: [id number]  Unit/Bed#: ICU 04-01 Encounter: [id number]      Daily Screen         7/21/2024  0340 7/21/2024  0743          Patient safety screen outcome:: Failed Passed      Not Ready for Weaning due to:: Underline problem not resolved --      Spont breathing trial % for 30 min: -- Yes      Spont breathing trial outcome:: -- Passed                Physical Exam:   Assessment Type: Assess only  General Appearance: Awake  Respiratory Pattern: Assisted  Chest Assessment: Chest expansion symmetrical  Bilateral Breath Sounds: (P) Coarse  Cough: (P) Strong, Non-productive  Suction: (P) ET Tube  O2 Device: vent      Resp Comments: (P) Pt placed on wean, pt awake and agitated. Plan is to extubate shortly.
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
## 2024-07-21 NOTE — ED PROVIDER NOTES
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 Primary osteoarthritis of one hip, left       !! - Potential duplicate medications found. Please discuss with provider.          No discharge procedures on file.    PDMP Review       None            ED Provider  Electronically Signed by             Torsten Roman MD  07/21/24 0600

## 2024-07-21 NOTE — RESPIRATORY THERAPY NOTE
07/21/24 0104   Respiratory Assessment   General Appearance Sedated   Respiratory Pattern Assisted   Chest Assessment Chest expansion symmetrical   Bilateral Breath Sounds Coarse   Suction Oral   Resp Comments pt intubated in tr04 to protect airway. pt was intubated by MD with glidescope ETT 7.5@ 25  teeth. pt placed on cmv 14/440/60% peep6.   O2 Device vent   Vent Information   Vent type Samuels C3  (transport vent)   Samuels Vent Mode (S)CMV   $ Vent Charge-INITIAL Yes   Ventilator Start Yes   $ Pulse Oximetry Spot Check Charge Completed   (S)CMV Settings   Resp Rate (BPM) 14 BPM   VT (mL) 440 mL   FIO2 (%) 60 %   PEEP (cmH2O) 6 cmH2O   I:E Ratio 1:4   Insp Time (%) 1 %   Flow Trigger (LPM) 5   (S)CMV Actuals   Resp Rate (BPM) 14 BPM   VT (mL) 439   MV 6.1   MAP (cmH2O) 8.8 cmH2O   Peak Pressure (cmH2O) 19 cmH2O   I:E Ratio (Obs) 1:4   Static Compliance (mL/cmH20) 55.2 mL/cmH2O   (S)CMV Alarms   High Peak Pressure (cmH2O) 40   Low Pressure (cmH2O) 5 cm H2O   High Resp Rate (BPM) 40 BPM   Low Resp Rate (BPM) 12 BPM   High MV (L/min) 15 L/min   Low MV (L/min) 3 L/min   High VT (mL) 1000 mL   Low VT (mL) 300 mL   Apnea Time (s) 20 S   Maintenance   Alarm (pink) cable attached No   Resuscitation bag with peep valve at bedside Yes   Water bag changed No   Circuit changed No   Daily Screen   Patient safety screen outcome: Failed   Not Ready for Weaning due to: Underline problem not resolved   ETT  Oral 7.5 mm   Placement Date/Time: 07/21/24 0100   Mask Ventilation: Ventilated by mask (1)  Preoxygenated: Yes  Type: Oral  Tube Size: 7.5 mm  Laryngoscope: GlideScope  Location: Oral  Insertion: Atraumatic  Insertion attempts: 1  Placement Verification: Auscultat...   Secured at (cm) 25   Measured from Teeth   Secured Location Right   Secured by Commercial tube simpson   Site Condition Dry   Cuff Pressure (color) Green   HI-LO Suction  Continuous low suction   HI-LO Secretions Scant   HI-LO Intervention Patent     RT  Ventilator Management Note  Andrei Morales 58 y.o. male MRN: [id number]  Unit/Bed#: ICU 04-01 Encounter: [id number]      Daily Screen         7/21/2024  0104 7/21/2024  0340          Patient safety screen outcome:: Failed Failed      Not Ready for Weaning due to:: Underline problem not resolved Underline problem not resolved                Physical Exam:   Assessment Type: Assess only  General Appearance: Sedated  Respiratory Pattern: Assisted  Chest Assessment: Chest expansion symmetrical  Bilateral Breath Sounds: Coarse  Suction: Oral  O2 Device: vent      Resp Comments: pt transported to unit from ed,  and remains on current settings. ETT secured. pt sucitoned as needed

## 2024-07-21 NOTE — RESPIRATORY THERAPY NOTE
07/21/24 0340   Respiratory Assessment   Assessment Type Assess only   General Appearance Sedated   Respiratory Pattern Assisted   Chest Assessment Chest expansion symmetrical   Bilateral Breath Sounds Coarse   Suction Oral   Resp Comments pt transported to unit from ed,  and remains on current settings. ETT secured. pt sucitoned as needed   O2 Device vent   Vent Information   Vent type Samuels C3   Samuels Vent Mode (S)CMV   $ Pulse Oximetry Spot Check Charge Completed   (S)CMV Settings   Resp Rate (BPM) 14 BPM   VT (mL) 440 mL   FIO2 (%) 60 %   PEEP (cmH2O) 6 cmH2O   I:E Ratio 1:4   Insp Time (%) 1 %   Flow Trigger (LPM) 5   Humidification Heater   Heater Temperature (Set) 95 °F (35 °C)   (S)CMV Actuals   Resp Rate (BPM) 14 BPM   VT (mL) 468   MV 6.4   MAP (cmH2O) 9.1 cmH2O   Peak Pressure (cmH2O) 18 cmH2O   I:E Ratio (Obs) 1:3.3   Static Compliance (mL/cmH20) 63.7 mL/cmH2O   Plateau Pressure (cm H2O) 18.7 cm H2O   (S)CMV Alarms   High Peak Pressure (cmH2O) 40   Low Pressure (cmH2O) 5 cm H2O   High Resp Rate (BPM) 40 BPM   Low Resp Rate (BPM) 12 BPM   High MV (L/min) 15 L/min   Low MV (L/min) 3 L/min   High VT (mL) 1000 mL   Low VT (mL) 300 mL   Apnea Time (s) 20 S   Maintenance   Alarm (pink) cable attached No   Resuscitation bag with peep valve at bedside Yes   Water bag changed No   Circuit changed No   Daily Screen   Patient safety screen outcome: Failed   Not Ready for Weaning due to: Underline problem not resolved   ETT  Oral 7.5 mm   Placement Date/Time: 07/21/24 0100   Mask Ventilation: Ventilated by mask (1)  Preoxygenated: Yes  Type: Oral  Tube Size: 7.5 mm  Laryngoscope: GlideScope  Location: Oral  Insertion: Atraumatic  Insertion attempts: 1  Placement Verification: Auscultat...   Secured at (cm) 25   Measured from Teeth   Secured Location Center   Secured by Commercial tube simpson   Site Condition Dry   Cuff Pressure (color) Green   HI-LO Suction  Continuous low suction   HI-LO Secretions Scant    HI-LO Intervention Patent     RT Ventilator Management Note  Andrei Morales 58 y.o. male MRN: [id number]  Unit/Bed#: ICU 04-01 Encounter: [id number]      Daily Screen         7/21/2024  0340             Patient safety screen outcome:: Failed    Not Ready for Weaning due to:: Underline problem not resolved              Physical Exam:   Assessment Type: Assess only  General Appearance: Sedated  Respiratory Pattern: Assisted  Chest Assessment: Chest expansion symmetrical  Bilateral Breath Sounds: Coarse  Suction: Oral  O2 Device: vent      Resp Comments: pt transported to unit from ed,  and remains on current settings. ETT secured. pt sucitoned as needed
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
## 2024-07-23 NOTE — UTILIZATION REVIEW
NOTIFICATION OF ADMISSION DISCHARGE   This is a Notification of Discharge from [facility]. Please be advised that this patient has been discharge from our facility. Below you will find the admission and discharge date and time including the patient’s disposition.   UTILIZATION REVIEW CONTACT:  Arnoldo Rios  Utilization   Network Utilization Review Department  Phone: [phone] x carefully listen to the prompts. All voicemails are confidential.  Email: [email or web address]     ADMISSION INFORMATION  PRESENTATION DATE: 7/21/2024 12:47 AM  OBERVATION ADMISSION DATE: N/A  INPATIENT ADMISSION DATE: 7/21/24  3:03 AM   DISCHARGE DATE: 7/21/2024 12:03 PM   DISPOSITION:Home/Self Care    Network Utilization Review Department  ATTENTION: Please call with any questions or concerns to [phone] and carefully listen to the prompts so that you are directed to the right person. All voicemails are confidential.   For Discharge needs, contact Care Management DC Support Team at [phone] opt. 2  Send all requests for admission clinical reviews, approved or denied determinations and any other requests to dedicated fax number below belonging to the campus where the patient is receiving treatment. List of dedicated fax numbers for the Facilities:  FACILITY NAME UR FAX NUMBER   ADMISSION DENIALS (Administrative/Medical Necessity) [phone]   DISCHARGE SUPPORT TEAM ([facility]) [phone]   PARENT CHILD HEALTH (Maternity/NICU/Pediatrics) [phone]   [facility] [phone]   [facility] [phone]   [facility] [phone]   [facility] [phone]   [facility] [phone]   [facility] [phone]   [facility] [phone]   [facility] [phone]    [facility] [phone]   [facility] [phone]   [facility] [phone]   [facility] [phone]

## 2024-07-24 ENCOUNTER — TRANSITIONAL CARE MANAGEMENT (OUTPATIENT)
Dept: FAMILY MEDICINE CLINIC | Facility: CLINIC | Age: 59
End: 2024-07-24

## 2024-08-07 ENCOUNTER — OFFICE VISIT (OUTPATIENT)
Dept: FAMILY MEDICINE CLINIC | Facility: CLINIC | Age: 59
End: 2024-08-07
Payer: COMMERCIAL

## 2024-08-07 VITALS
HEART RATE: 56 BPM | BODY MASS INDEX: 23.96 KG/M2 | TEMPERATURE: 98 F | WEIGHT: 149.1 LBS | RESPIRATION RATE: 18 BRPM | DIASTOLIC BLOOD PRESSURE: 78 MMHG | HEIGHT: 66 IN | SYSTOLIC BLOOD PRESSURE: 124 MMHG | OXYGEN SATURATION: 97 %

## 2024-08-07 DIAGNOSIS — T17.908D ASPIRATION INTO AIRWAY, SUBSEQUENT ENCOUNTER: ICD-10-CM

## 2024-08-07 DIAGNOSIS — I86.1 VARICOCELE: ICD-10-CM

## 2024-08-07 DIAGNOSIS — N43.40 SPERMATOCELE OF EPIDIDYMIS: ICD-10-CM

## 2024-08-07 DIAGNOSIS — F10.920 ALCOHOLIC INTOXICATION WITHOUT COMPLICATION (HCC): Primary | ICD-10-CM
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
## 2024-08-07 PROCEDURE — 99214 OFFICE O/P EST MOD 30 MIN: CPT | Performed by: INTERNAL MEDICINE
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
## 2024-09-03 ENCOUNTER — HOSPITAL ENCOUNTER (OUTPATIENT)
Dept: ULTRASOUND IMAGING | Facility: HOSPITAL | Age: 59
Discharge: HOME | End: 2024-09-03
Payer: COMMERCIAL

## 2024-09-03 DIAGNOSIS — I86.1 VARICOCELE: ICD-10-CM

## 2024-09-03 DIAGNOSIS — N43.40 SPERMATOCELE OF EPIDIDYMIS: ICD-10-CM

## 2024-09-03 PROCEDURE — 76870 US EXAM SCROTUM: CPT

## 2024-12-10 ENCOUNTER — OFFICE VISIT (OUTPATIENT)
Dept: FAMILY MEDICINE CLINIC | Facility: CLINIC | Age: 59
End: 2024-12-10
Payer: COMMERCIAL

## 2024-12-10 VITALS
SYSTOLIC BLOOD PRESSURE: 128 MMHG | HEART RATE: 75 BPM | OXYGEN SATURATION: 99 % | BODY MASS INDEX: 23.24 KG/M2 | RESPIRATION RATE: 18 BRPM | TEMPERATURE: 98.2 F | WEIGHT: 144.6 LBS | DIASTOLIC BLOOD PRESSURE: 78 MMHG | HEIGHT: 66 IN

## 2024-12-10 DIAGNOSIS — F10.10 ALCOHOL ABUSE: ICD-10-CM

## 2024-12-10 DIAGNOSIS — Z12.5 SCREENING FOR PROSTATE CANCER: ICD-10-CM

## 2024-12-10 DIAGNOSIS — Z00.00 ANNUAL PHYSICAL EXAM: Primary | ICD-10-CM

## 2024-12-10 DIAGNOSIS — Z12.11 SCREENING FOR COLON CANCER: ICD-10-CM

## 2024-12-10 DIAGNOSIS — Z13.6 SCREENING FOR CARDIOVASCULAR CONDITION: ICD-10-CM

## 2024-12-10 DIAGNOSIS — L91.8 ACROCHORDON: ICD-10-CM

## 2024-12-10 DIAGNOSIS — Z11.59 NEED FOR HEPATITIS C SCREENING TEST: ICD-10-CM

## 2024-12-10 DIAGNOSIS — I86.1 VARICOCELE: ICD-10-CM

## 2024-12-10 DIAGNOSIS — R73.03 PREDIABETES: ICD-10-CM

## 2024-12-10 PROBLEM — M87.00 AVN (AVASCULAR NECROSIS OF BONE) (HCC): Status: RESOLVED | Noted: 2024-01-05 | Resolved: 2024-12-10

## 2024-12-10 PROBLEM — J96.00 ACUTE RESPIRATORY FAILURE (HCC): Status: RESOLVED | Noted: 2024-07-21 | Resolved: 2024-12-10

## 2024-12-10 PROBLEM — F10.929 ALCOHOL INTOXICATION (HCC): Status: RESOLVED | Noted: 2024-07-21 | Resolved: 2024-12-10

## 2024-12-10 PROCEDURE — 99396 PREV VISIT EST AGE 40-64: CPT | Performed by: INTERNAL MEDICINE

## 2024-12-10 NOTE — PATIENT INSTRUCTIONS
"Patient Education     Routine physical for adults   The Basics   Written by the doctors and editors at [facility]   What is a physical? -- A physical is a routine visit, or \"check-up,\" with your doctor. You might also hear it called a \"wellness visit\" or \"preventive visit.\"  During each visit, the doctor will:   Ask about your physical and mental health   Ask about your habits, behaviors, and lifestyle   Do an exam   Give you vaccines if needed   Talk to you about any medicines you take   Give advice about your health   Answer your questions  Getting regular check-ups is an important part of taking care of your health. It can help your doctor find and treat any problems you have. But it's also important for preventing health problems.  A routine physical is different from a \"sick visit.\" A sick visit is when you see a doctor because of a health concern or problem. Since physicals are scheduled ahead of time, you can think about what you want to ask the doctor.  How often should I get a physical? -- It depends on your age and health. In general, for people age 21 years and older:   If you are younger than 50 years, you might be able to get a physical every 3 years.   If you are 50 years or older, your doctor might recommend a physical every year.  If you have an ongoing health condition, like diabetes or high blood pressure, your doctor will probably want to see you more often.  What happens during a physical? -- In general, each visit will include:   Physical exam - The doctor or nurse will check your height, weight, heart rate, and blood pressure. They will also look at your eyes and ears. They will ask about how you are feeling and whether you have any symptoms that bother you.   Medicines - It's a good idea to bring a list of all the medicines you take to each doctor visit. Your doctor will talk to you about your medicines and answer any questions. Tell them if you are having any side effects that bother you. You " "should also tell them if you are having trouble paying for any of your medicines.   Habits and behaviors - This includes:   Your diet   Your exercise habits   Whether you smoke, drink alcohol, or use drugs   Whether you are sexually active   Whether you feel safe at home  Your doctor will talk to you about things you can do to improve your health and lower your risk of health problems. They will also offer help and support. For example, if you want to quit smoking, they can give you advice and might prescribe medicines. If you want to improve your diet or get more physical activity, they can help you with this, too.   Lab tests, if needed - The tests you get will depend on your age and situation. For example, your doctor might want to check your:   Cholesterol   Blood sugar   Iron level   Vaccines - The recommended vaccines will depend on your age, health, and what vaccines you already had. Vaccines are very important because they can prevent certain serious or deadly infections.   Discussion of screening - \"Screening\" means checking for diseases or other health problems before they cause symptoms. Your doctor can recommend screening based on your age, risk, and preferences. This might include tests to check for:   Cancer, such as breast, prostate, cervical, ovarian, colorectal, prostate, lung, or skin cancer   Sexually transmitted infections, such as chlamydia and gonorrhea   Mental health conditions like depression and anxiety  Your doctor will talk to you about the different types of screening tests. They can help you decide which screenings to have. They can also explain what the results might mean.   Answering questions - The physical is a good time to ask the doctor or nurse questions about your health. If needed, they can refer you to other doctors or specialists, too.  Adults older than 65 years often need other care, too. As you get older, your doctor will talk to you about:   How to prevent falling at " home   Hearing or vision tests   Memory testing   How to take your medicines safely   Making sure that you have the help and support you need at home  All topics are updated as new evidence becomes available and our peer review process is complete.  This topic retrieved from [employer] on: May 02, 2024.  Topic 988198 Version 1.0  Release: 32.4.3 - C32.122  © 2024 UpToDate, Inc. and/or its affiliates. All rights reserved.  Consumer Information Use and Disclaimer   Disclaimer: This generalized information is a limited summary of diagnosis, treatment, and/or medication information. It is not meant to be comprehensive and should be used as a tool to help the user understand and/or assess potential diagnostic and treatment options. It does NOT include all information about conditions, treatments, medications, side effects, or risks that may apply to a specific patient. It is not intended to be medical advice or a substitute for the medical advice, diagnosis, or treatment of a health care provider based on the health care provider's examination and assessment of a patient's specific and unique circumstances. Patients must speak with a health care provider for complete information about their health, medical questions, and treatment options, including any risks or benefits regarding use of medications. This information does not endorse any treatments or medications as safe, effective, or approved for treating a specific patient. UpToDate, Inc. and its affiliates disclaim any warranty or liability relating to this information or the use thereof.The use of this information is governed by the Terms of Use, available at https://www.woltersHole 19uwer.com/en/know/clinical-effectiveness-terms. 2024© UpToDate, Inc. and its affiliates and/or licensors. All rights reserved.  Copyright   © 2024 UpToDate, Inc. and/or its affiliates. All rights reserved.    Patient Education     Routine physical for adults   The Basics   Written by the  "doctors and editors at [facility]   What is a physical? -- A physical is a routine visit, or \"check-up,\" with your doctor. You might also hear it called a \"wellness visit\" or \"preventive visit.\"  During each visit, the doctor will:   Ask about your physical and mental health   Ask about your habits, behaviors, and lifestyle   Do an exam   Give you vaccines if needed   Talk to you about any medicines you take   Give advice about your health   Answer your questions  Getting regular check-ups is an important part of taking care of your health. It can help your doctor find and treat any problems you have. But it's also important for preventing health problems.  A routine physical is different from a \"sick visit.\" A sick visit is when you see a doctor because of a health concern or problem. Since physicals are scheduled ahead of time, you can think about what you want to ask the doctor.  How often should I get a physical? -- It depends on your age and health. In general, for people age 21 years and older:   If you are younger than 50 years, you might be able to get a physical every 3 years.   If you are 50 years or older, your doctor might recommend a physical every year.  If you have an ongoing health condition, like diabetes or high blood pressure, your doctor will probably want to see you more often.  What happens during a physical? -- In general, each visit will include:   Physical exam - The doctor or nurse will check your height, weight, heart rate, and blood pressure. They will also look at your eyes and ears. They will ask about how you are feeling and whether you have any symptoms that bother you.   Medicines - It's a good idea to bring a list of all the medicines you take to each doctor visit. Your doctor will talk to you about your medicines and answer any questions. Tell them if you are having any side effects that bother you. You should also tell them if you are having trouble paying for any of your " "medicines.   Habits and behaviors - This includes:   Your diet   Your exercise habits   Whether you smoke, drink alcohol, or use drugs   Whether you are sexually active   Whether you feel safe at home  Your doctor will talk to you about things you can do to improve your health and lower your risk of health problems. They will also offer help and support. For example, if you want to quit smoking, they can give you advice and might prescribe medicines. If you want to improve your diet or get more physical activity, they can help you with this, too.   Lab tests, if needed - The tests you get will depend on your age and situation. For example, your doctor might want to check your:   Cholesterol   Blood sugar   Iron level   Vaccines - The recommended vaccines will depend on your age, health, and what vaccines you already had. Vaccines are very important because they can prevent certain serious or deadly infections.   Discussion of screening - \"Screening\" means checking for diseases or other health problems before they cause symptoms. Your doctor can recommend screening based on your age, risk, and preferences. This might include tests to check for:   Cancer, such as breast, prostate, cervical, ovarian, colorectal, prostate, lung, or skin cancer   Sexually transmitted infections, such as chlamydia and gonorrhea   Mental health conditions like depression and anxiety  Your doctor will talk to you about the different types of screening tests. They can help you decide which screenings to have. They can also explain what the results might mean.   Answering questions - The physical is a good time to ask the doctor or nurse questions about your health. If needed, they can refer you to other doctors or specialists, too.  Adults older than 65 years often need other care, too. As you get older, your doctor will talk to you about:   How to prevent falling at home   Hearing or vision tests   Memory testing   How to take your " medicines safely   Making sure that you have the help and support you need at home  All topics are updated as new evidence becomes available and our peer review process is complete.  This topic retrieved from [employer] on: May 02, 2024.  Topic 290952 Version 1.0  Release: 32.4.3 - C32.122  © 2024 UpToDate, Inc. and/or its affiliates. All rights reserved.  Consumer Information Use and Disclaimer   Disclaimer: This generalized information is a limited summary of diagnosis, treatment, and/or medication information. It is not meant to be comprehensive and should be used as a tool to help the user understand and/or assess potential diagnostic and treatment options. It does NOT include all information about conditions, treatments, medications, side effects, or risks that may apply to a specific patient. It is not intended to be medical advice or a substitute for the medical advice, diagnosis, or treatment of a health care provider based on the health care provider's examination and assessment of a patient's specific and unique circumstances. Patients must speak with a health care provider for complete information about their health, medical questions, and treatment options, including any risks or benefits regarding use of medications. This information does not endorse any treatments or medications as safe, effective, or approved for treating a specific patient. UpToDate, Inc. and its affiliates disclaim any warranty or liability relating to this information or the use thereof.The use of this information is governed by the Terms of Use, available at https://www.woltersBlueArcuwer.com/en/know/clinical-effectiveness-terms. 2024© UpToDate, Inc. and its affiliates and/or licensors. All rights reserved.  Copyright   © 2024 UpToDate, Inc. and/or its affiliates. All rights reserved.

## 2024-12-10 NOTE — PROGRESS NOTES
Adult Annual Physical  Name: Andrei Morales      : 1965      MRN: [id number]  Encounter Provider: Tammy Hernandez MD  Encounter Date: 12/10/2024   Encounter department: [facility] PRIMARY [facility]    Assessment & Plan  Annual physical exam         Screening for colon cancer    Orders:  •  Cologuard    Prediabetes    Orders:  •  Hemoglobin A1C; Future  •  Comprehensive metabolic panel; Future    Alcohol abuse    Orders:  •  Comprehensive metabolic panel; Future  •  CBC and Platelet; Future    Screening for prostate cancer    Orders:  •  PSA, Total Screen; Future    Screening for cardiovascular condition    Orders:  •  Lipid Panel with Direct LDL reflex; Future    Need for hepatitis C screening test    Orders:  •  Hepatitis C antibody; Future    Varicocele         Acrochordon    Orders:  •  Ambulatory Referral to General Surgery; Future      Immunizations and preventive care screenings were discussed with patient today. Appropriate education was printed on patient's after visit summary.    Discussed risks and benefits of prostate cancer screening. We discussed the controversial history of PSA screening for prostate cancer in the [place] as well as the risk of over detection and over treatment of prostate cancer by way of PSA screening.  The patient understands that PSA blood testing is an imperfect way to screen for prostate cancer and that elevated PSA levels in the blood may also be caused by infection, inflammation, prostatic trauma or manipulation, urological procedures, or by benign prostatic enlargement.    The role of the digital rectal examination in prostate cancer screening was also discussed and I discussed with him that there is large interobserver variability in the findings of digital rectal examination.    Counseling:  Alcohol/drug use: discussed moderation in alcohol intake, the recommendations for healthy alcohol use, and avoidance of illicit drug use.  Injury prevention:  discussed safety/seat belts, safety helmets, smoke detectors, carbon monoxide detectors, and smoking near bedding or upholstery.  Exercise: the importance of regular exercise/physical activity was discussed. Recommend exercise 3-5 times per week for at least 30 minutes.          History of Present Illness     Adult Annual Physical:  Patient presents for annual physical. 58yo male with history of alcohol abuse, AVN of left hip here for annual physical .     Diet and Physical Activity:  - Diet/Nutrition: low carb diet. mostly meat, cheese, snacks on nuts. Limited fruits and vegetables but takes supplements  - Exercise: walking and strength training exercises. active at work    Depression Screening:  - PHQ-2 Score: 0    General Health:  - Sleep: sleeps well and 7-8 hours of sleep on average.  - Vision: goes for regular eye exams and wears glasses.  - Dental: regular dental visits.     Health:    - Urinary symptoms: none.     Review of Systems   Constitutional:  Negative for appetite change, chills, fatigue and fever.   HENT:  Negative for congestion and sore throat.    Respiratory:  Negative for cough and shortness of breath.    Cardiovascular:  Negative for chest pain, palpitations and leg swelling.   Gastrointestinal:  Negative for abdominal pain, blood in stool, constipation, diarrhea, nausea and vomiting.   Genitourinary:  Negative for difficulty urinating, hematuria, scrotal swelling and testicular pain.   Musculoskeletal:  Negative for arthralgias.   Neurological:  Negative for dizziness, light-headedness, numbness and headaches.   Psychiatric/Behavioral:  Positive for dysphoric mood. Negative for sleep disturbance. The patient is nervous/anxious.      Social History     Tobacco Use   • Smoking status: Never   • Smokeless tobacco: Never   Vaping Use   • Vaping status: Never Used   Substance and Sexual Activity   • Alcohol use: Not Currently   • Drug use: Never   • Sexual activity: Not on file     Comment: defer  "      Objective   /78   Pulse 75   Temp 98.2 °F (36.8 °C) (Temporal)   Resp 18   Ht 5' 6\" (1.676 m)   Wt 65.6 kg (144 lb 9.6 oz)   SpO2 99%   BMI 23.34 kg/m²     Physical Exam  Vitals reviewed.   Constitutional:       General: He is not in acute distress.     Appearance: He is normal weight.   HENT:      Head: Normocephalic and atraumatic.      Right Ear: Tympanic membrane, ear canal and external ear normal.      Left Ear: Tympanic membrane, ear canal and external ear normal.      Mouth/Throat:      Pharynx: No oropharyngeal exudate or posterior oropharyngeal erythema.   Cardiovascular:      Rate and Rhythm: Normal rate and regular rhythm.      Heart sounds: No murmur heard.     No friction rub. No gallop.   Pulmonary:      Effort: Pulmonary effort is normal. No respiratory distress.      Breath sounds: No wheezing, rhonchi or rales.   Abdominal:      General: Abdomen is flat. Bowel sounds are normal. There is no distension.      Palpations: Abdomen is soft. There is no mass.      Tenderness: There is no abdominal tenderness. There is no guarding or rebound.   Lymphadenopathy:      Cervical: No cervical adenopathy.   Skin:     General: Skin is warm and dry.   Neurological:      General: No focal deficit present.      Mental Status: He is alert.   Psychiatric:         Mood and Affect: Mood normal.         Behavior: Behavior normal.         "

## 2025-03-07 ENCOUNTER — APPOINTMENT (OUTPATIENT)
Dept: RADIOLOGY | Age: 60
End: 2025-03-07
Payer: COMMERCIAL

## 2025-03-07 ENCOUNTER — OFFICE VISIT (OUTPATIENT)
Dept: OBGYN CLINIC | Facility: CLINIC | Age: 60
End: 2025-03-07
Payer: COMMERCIAL

## 2025-03-07 DIAGNOSIS — Z96.642 STATUS POST TOTAL REPLACEMENT OF LEFT HIP: Primary | ICD-10-CM

## 2025-03-07 DIAGNOSIS — Z96.642 STATUS POST TOTAL REPLACEMENT OF LEFT HIP: ICD-10-CM

## 2025-03-07 PROCEDURE — 99213 OFFICE O/P EST LOW 20 MIN: CPT | Performed by: STUDENT IN AN ORGANIZED HEALTH CARE EDUCATION/TRAINING PROGRAM

## 2025-03-07 PROCEDURE — 73502 X-RAY EXAM HIP UNI 2-3 VIEWS: CPT

## 2025-03-07 NOTE — PROGRESS NOTES
"      Date: 25  Andrei Morales   MRN# [id number]  : 1965      Chief Complaint: Post op Left  primary Total Hip Arthroplasty    Assessment and Plan:  Assessment & Plan  Status post total replacement of left hip    Orders:    XR hip/pelv 2-3 vws left if performed; Future        Patient is 1 year s/p left total hip arthroplasty  - WBAT    - Tylenol and Celebrex for pain control prn  - Continue PT/HEP as directed  - May shower and soak/submerge incision  - No antibiotic dental prophylaxis is necessary  - No restrictions from our standpoint. Let pain be a guide  - RTC in 2-3 years left hip xrays needed     Subjective:   Patient is  1 year  s/p left primary total hip arthroplasty. Patient is feeling great and is very happy with the overall process.    Date of procedure: 24  Doing well, no new problems  Pain progressively improving  Improved swelling  Ambulating with no assistive device    Allergy:  No Known Allergies  Medications:  all current active meds have been reviewed  Past Medical History:  Past Medical History:   Diagnosis Date    Adopted person     Arthritis     Environmental allergies     \"outside allergies sometimes\"    Exercise involving walking     occas    HL (hearing loss)     Left hip pain     and leg    Tinnitus     Use of cane as ambulatory aid     sometimes    Wears glasses     \"magnifiers to read\"     Past Surgical History:  Past Surgical History:   Procedure Laterality Date    HAND SURGERY      NV ARTHRP ACETBLR/PROX FEM PROSTC AGRFT/ALGRFT Left 2024    Procedure: ARTHROPLASTY HIP TOTAL ANTERIOR, LEFT, SAMEDAY DISCHARGE;  Surgeon: Justin Hannah MD;  Location: [facility];  Service: Orthopedics    VASECTOMY       Family History:  Family History   Adopted: Yes   Problem Relation Age of Onset    No Known Problems Mother     No Known Problems Father      Social History:  Social History     Substance and Sexual Activity   Alcohol Use Not Currently     Social History " "    Substance and Sexual Activity   Drug Use Never     Social History     Tobacco Use   Smoking Status Never   Smokeless Tobacco Never             Review of Systems:  General- denies fever/chills  HEENT- denies hearing loss or sore throat  Eyes- denies eye pain or visual disturbances, denies red eyes  Respiratory- denies cough or SOB  Cardio- denies chest pain or palpitations  GI- denies abdominal pain  Endocrine- denies urinary frequency  Urinary- denies pain with urination  Musculoskeletal- Negative except noted above  Skin- denies rashes or wounds  Neurological- denies dizziness or headache  Psychiatric- denies anxiety or difficulty concentrating    Objective:   BP Readings from Last 1 Encounters:   12/10/24 128/78      Wt Readings from Last 1 Encounters:   12/10/24 65.6 kg (144 lb 9.6 oz)      Pulse Readings from Last 1 Encounters:   12/10/24 75        BMI: Estimated body mass index is 23.34 kg/m² as calculated from the following:    Height as of 12/10/24: 5' 6\" (1.676 m).    Weight as of 12/10/24: 65.6 kg (144 lb 9.6 oz).    Physical Exam  There were no vitals taken for this visit.  General/Constitutional: No apparent distress: well-nourished and well developed.  Eyes: normal ocular motion  Cardio: RRR, Normal S1S2, No m/r/g.   Lymphatic: No appreciable lymphadenopathy  Respiratory: Non-labored breathing, CTA b/l no w/c/r  Vascular: No edema, swelling or tenderness, except as noted in detailed exam. Extremities well perfused. No LE edema  Integumentary: No impressive skin lesions present, except as noted in detailed exam.  Neuro: No ataxia or tremors noted  Psych: Normal mood and affect, oriented to person, place and time. Appropriate affect.  Musculoskeletal: Normal, except as noted in detailed exam and in HPI.    Gait and Station:   normal    left Hip Examination  Incision: well healed  Hip ROM:    painless  Flexion: 110 degrees.   External rotation: 30 degrees.   Internal rotation: 50  deg.   Abduction: 40 " degrees.   Motor: 5/5 EHL/FHL/TA/GS/QD/HS  Neurovascular exam is intact.   No evidence of calf tightness or posterior cords    Images:    I personally reviewed relevant images in the PACS system and my interpretation is as follows:  X-rays of the left Hip reveal a total hip arthroplasty in appropriate alignment without evidence of dislocation, migration, wear or loosening.      Scribe Attestation      I,:  Tomás Inman am acting as a scribe while in the presence of the attending physician.:       I,:  Justin Hannah MD personally performed the services described in this documentation    as scribed in my presence.:               Justin Hannah MD  Adult Reconstruction Specialist   [facility]

## 2025-06-11 ENCOUNTER — OFFICE VISIT (OUTPATIENT)
Dept: FAMILY MEDICINE CLINIC | Facility: CLINIC | Age: 60
End: 2025-06-11
Payer: COMMERCIAL

## 2025-06-11 VITALS
BODY MASS INDEX: 22.98 KG/M2 | RESPIRATION RATE: 18 BRPM | WEIGHT: 143 LBS | HEART RATE: 91 BPM | HEIGHT: 66 IN | TEMPERATURE: 97.7 F | OXYGEN SATURATION: 99 % | SYSTOLIC BLOOD PRESSURE: 136 MMHG | DIASTOLIC BLOOD PRESSURE: 90 MMHG

## 2025-06-11 DIAGNOSIS — M25.562 ACUTE PAIN OF LEFT KNEE: ICD-10-CM

## 2025-06-11 DIAGNOSIS — S03.00XA DISLOCATION OF TEMPOROMANDIBULAR JOINT, INITIAL ENCOUNTER: Primary | ICD-10-CM

## 2025-06-11 PROCEDURE — 99214 OFFICE O/P EST MOD 30 MIN: CPT | Performed by: NURSE PRACTITIONER

## 2025-06-11 RX ORDER — METHOCARBAMOL 750 MG/1
750 TABLET, FILM COATED ORAL EVERY 6 HOURS PRN
Qty: 30 TABLET | Refills: 0 | Status: SHIPPED | OUTPATIENT
Start: 2025-06-11

## 2025-06-11 NOTE — PROGRESS NOTES
"Name: Andrei Morales      : 1965      MRN: [id number]  Encounter Provider: NALLELY Nunez  Encounter Date: 2025   Encounter department: [facility]  :  Assessment & Plan  Dislocation of temporomandibular joint, initial encounter    Orders:  •  methocarbamol (ROBAXIN) 750 mg tablet; Take 1 tablet (750 mg total) by mouth every 6 (six) hours as needed for muscle spasms  •  diclofenac sodium (VOLTAREN) 50 mg EC tablet; Take 1 tablet (50 mg total) by mouth 2 (two) times a day    Acute pain of left knee    Orders:  •  diclofenac sodium (VOLTAREN) 50 mg EC tablet; Take 1 tablet (50 mg total) by mouth 2 (two) times a day           History of Present Illness   Patient here for jaw pain on the right side.  Tried taking amoxicillin, was on tylenol and ibuprofen for muscular chest pain.  Has had increased  stress recently.  Cracked tooth on the one side.       Review of Systems   Constitutional: Negative.  Negative for fatigue and fever.   HENT:  Positive for dental problem.         Jaw pain   Respiratory: Negative.  Negative for shortness of breath and wheezing.    Cardiovascular: Negative.  Negative for chest pain and palpitations.   Neurological: Negative.        Objective   /90   Pulse 91   Temp 97.7 °F (36.5 °C)   Resp 18   Ht 5' 6\" (1.676 m)   Wt 64.9 kg (143 lb)   SpO2 99%   BMI 23.08 kg/m²      Physical Exam  Vitals and nursing note reviewed.   Constitutional:       General: He is not in acute distress.     Appearance: He is well-developed.   HENT:      Head:     Pulmonary:      Effort: Pulmonary effort is normal. No respiratory distress.      Breath sounds: Normal breath sounds. No wheezing.     Skin:     General: Skin is warm and dry.      Findings: No erythema.     Neurological:      General: No focal deficit present.      Mental Status: He is alert.     Psychiatric:         Mood and Affect: Mood normal.         Behavior: Behavior is cooperative.         "

## 2025-06-11 NOTE — PATIENT INSTRUCTIONS
Patient Education     TMJ Exercises   About this topic   The temporomandibular joint is also called the TMJ. It is the joint in front of your ear. It connects your lower jaw to the side of your head. This joint is flexible and lets the jaw move up and down and side to side so you can talk, chew, and yawn. When you have a problem with your jaw, jaw joint, or other facial muscles, you have temporomandibular disorder or TMD. Injury to your jaw, grinding or clenching your teeth, uneven bite, arthritis, and stress are some of the causes of TMD. Exercises can help to make this problem better.  General   Before starting with a program, ask your doctor if you are healthy enough to do these exercises. Your doctor may have you work with a physical therapist to make a safe exercise program to meet your needs.  Strengthening exercises - Strengthening exercises keep your muscles firm and strong. Start by doing each exercise 2 to 3 times and work up to 10 times each. Try to do the exercises 2 to 3 times each day. Do all exercises slowly. Do these in front of a mirror, if possible.  Jaw opening/closing with tongue on roof of mouth - Close your mouth. Put your tongue on the top of your mouth by your front teeth. Now slide your tongue along the roof of your mouth as far back as you can. Open your mouth while keeping your tongue on the roof of the mouth.  Chin tucks - Stand up straight. Tuck your chin in and lengthen the back of your neck. Return to the starting position and repeat. It may help to stand up against a wall during this exercise. Try gently pushing your chin with two fingers while trying to flatten your neck against the wall. If you do this exercise lying down, try using a small rolled up washcloth under your neck. Push down into the washcloth when tucking in your chin.  Resisted jaw opening and closing - First, put your thumb under your jaw. Slowly open your mouth while you give some pressure with your thumb to make it  harder. Hold your mouth open for 5 seconds. Repeat. Next, open your mouth. Grab the part of your chin that juts out with your index finger and thumb. Slowly close your mouth while you give pressure with your finger to make it harder. Repeat.  Side to side and forward jaw movements - Place a quarter of an inch object such as a pen, tongue depressor, or a dental cotton roll in between your teeth. Slowly move your jaw from side to side. Repeat 10 times. Next, slowly move your bottom jaw forward. Repeat 10 times. You may also want to hold this exercise for 20 to 30 seconds for a few of the repetitions in order to stretch the muscles. Each day gradually increase the height of the object or objects.               What will the results be?   Less pain  Less clicking or popping  Less headaches  Ability to open mouth wider  Helpful tips   Doing exercises before a meal may be a good way to get into a routine.  Avoid chewing gum or eating chewy foods like taffy or caramels. Eat softer foods if you have this problem.  Apply a warm, wet washcloth to the joint near your ears to help ease pain.  Use both sides of your mouth to chew.  Lessen stress. Stress can make this problem worse. Try relaxation techniques.  Avoid yawning or opening your mouth too wide.  Do not bite your nails.  Do not lean on your chin with your hand.  Use protective headgear, like helmets or mouthguards, when playing sports.  Wear a helmet when skiing, snowboarding, biking, riding a motorcycle, or roller blading.  You may need a mouth guard if you clench or grind your teeth at night.  Last Reviewed Date   2020-10-12  Consumer Information Use and Disclaimer   This generalized information is a limited summary of diagnosis, treatment, and/or medication information. It is not meant to be comprehensive and should be used as a tool to help the user understand and/or assess potential diagnostic and treatment options. It does NOT include all information about  conditions, treatments, medications, side effects, or risks that may apply to a specific patient. It is not intended to be medical advice or a substitute for the medical advice, diagnosis, or treatment of a health care provider based on the health care provider's examination and assessment of a patient’s specific and unique circumstances. Patients must speak with a health care provider for complete information about their health, medical questions, and treatment options, including any risks or benefits regarding use of medications. This information does not endorse any treatments or medications as safe, effective, or approved for treating a specific patient. [employer] and its affiliates disclaim any warranty or liability relating to this information or the use thereof. The use of this information is governed by the Terms of Use, available at https://www.[employer].com/en/know/clinical-effectiveness-terms   Copyright   Copyright © 2024 [employer] and its affiliates and/or licensors. All rights reserved.

## 2025-06-25 ENCOUNTER — VBI (OUTPATIENT)
Dept: ADMINISTRATIVE | Facility: OTHER | Age: 60
End: 2025-06-25

## 2025-06-25 NOTE — TELEPHONE ENCOUNTER
06/25/25 3:31 PM     Chart reviewed for CRC: Colonoscopy ; nothing is submitted to the patient's insurance at this time.     Rosio Brooks MA   [facility]

## (undated) DEVICE — HOOD WITH PEEL AWAY FACE SHIELD: Brand: T7PLUS

## (undated) DEVICE — ADHESIVE SKIN CLOSURE SYS EXOFIN FUSION 22CM

## (undated) DEVICE — HANDPIECE SET WITH RETRACTABLE COAXIAL FAN SPRAY TIP AND SUCTION TUBE: Brand: INTERPULSE

## (undated) DEVICE — DRESSING MEPILEX AG BORDER POST-OP 4 X 10 IN

## (undated) DEVICE — TIBURON HIP DRAPE WITH POUCHES: Brand: CONVERTORS

## (undated) DEVICE — SUT MONOCRYL 3-0 PS-2 27 IN Y427H

## (undated) DEVICE — DRAPE C-ARM X-RAY

## (undated) DEVICE — HOOD: Brand: T7PLUS

## (undated) DEVICE — COBAN 6 IN STERILE

## (undated) DEVICE — DRAPE SHEET THREE QUARTER

## (undated) DEVICE — SUT VICRYL 0 CT-1 36 IN J946H

## (undated) DEVICE — GLOVE SRG BIOGEL 7.5

## (undated) DEVICE — 3M™ STERI-DRAPE™ U-DRAPE 1015: Brand: STERI-DRAPE™

## (undated) DEVICE — SUT STRATAFIX SPIRAL PDS PLUS 1 CTX 18 IN SXPP1A400

## (undated) DEVICE — SAW BLADE OSCILLATING BRAZOL 167

## (undated) DEVICE — CAPIT HIP COP -CMNT/POR-ACTIS

## (undated) DEVICE — VEST SURGEON DISPOSABLE

## (undated) DEVICE — SYRINGE 50ML LL

## (undated) DEVICE — BETHLEHEM TOTAL HIP, KIT: Brand: CARDINAL HEALTH

## (undated) DEVICE — GAUZE SPONGES,16 PLY: Brand: CURITY

## (undated) DEVICE — SPECIMEN CONTAINER STERILE PEEL PACK

## (undated) DEVICE — ELECTRODE BLADE E-Z CLEAN 4IN -0014A

## (undated) DEVICE — PLUMEPEN PRO 10FT

## (undated) DEVICE — NEEDLE SPINAL18G X 3.5 IN QUINCKE

## (undated) DEVICE — WEBRIL 6 IN UNSTERILE

## (undated) DEVICE — CAPIT UPCHRG EMPHASYS ACETABULAR

## (undated) DEVICE — POSITIONER HANA TABLE PACK

## (undated) DEVICE — ARTHROSCOPY FLOOR MAT

## (undated) DEVICE — ELECTRODE BLADE E-Z CLEAN 6.5IN -0014

## (undated) DEVICE — U-DRAPE: Brand: CONVERTORS

## (undated) DEVICE — SURGICAL GOWN, XL SMARTSLEEVE: Brand: CONVERTORS

## (undated) DEVICE — ANTIBACTERIAL UNDYED BRAIDED (POLYGLACTIN 910), SYNTHETIC ABSORBABLE SUTURE: Brand: COATED VICRYL

## (undated) DEVICE — GLOVE INDICATOR PI UNDERGLOVE SZ 8 BLUE